# Patient Record
Sex: FEMALE | Race: OTHER | HISPANIC OR LATINO
[De-identification: names, ages, dates, MRNs, and addresses within clinical notes are randomized per-mention and may not be internally consistent; named-entity substitution may affect disease eponyms.]

---

## 2018-04-05 ENCOUNTER — RESULT REVIEW (OUTPATIENT)
Age: 48
End: 2018-04-05

## 2018-10-18 ENCOUNTER — APPOINTMENT (OUTPATIENT)
Dept: ORTHOPEDIC SURGERY | Facility: CLINIC | Age: 48
End: 2018-10-18
Payer: COMMERCIAL

## 2018-10-18 VITALS — HEART RATE: 78 BPM | HEIGHT: 62 IN | DIASTOLIC BLOOD PRESSURE: 70 MMHG | SYSTOLIC BLOOD PRESSURE: 131 MMHG

## 2018-10-18 VITALS — HEIGHT: 62 IN | BODY MASS INDEX: 27.6 KG/M2 | WEIGHT: 150 LBS

## 2018-10-18 DIAGNOSIS — M70.41 PREPATELLAR BURSITIS, RIGHT KNEE: ICD-10-CM

## 2018-10-18 DIAGNOSIS — Z60.2 PROBLEMS RELATED TO LIVING ALONE: ICD-10-CM

## 2018-10-18 DIAGNOSIS — Z82.62 FAMILY HISTORY OF OSTEOPOROSIS: ICD-10-CM

## 2018-10-18 DIAGNOSIS — Z78.9 OTHER SPECIFIED HEALTH STATUS: ICD-10-CM

## 2018-10-18 DIAGNOSIS — Z82.61 FAMILY HISTORY OF ARTHRITIS: ICD-10-CM

## 2018-10-18 PROCEDURE — 73562 X-RAY EXAM OF KNEE 3: CPT | Mod: RT

## 2018-10-18 PROCEDURE — 99204 OFFICE O/P NEW MOD 45 MIN: CPT

## 2018-10-18 PROCEDURE — 72170 X-RAY EXAM OF PELVIS: CPT

## 2018-10-18 SDOH — SOCIAL STABILITY - SOCIAL INSECURITY: PROBLEMS RELATED TO LIVING ALONE: Z60.2

## 2018-11-02 PROBLEM — Z78.9 CONSUMES ALCOHOL OCCASIONALLY: Status: ACTIVE | Noted: 2018-10-18

## 2018-11-02 PROBLEM — Z82.61 FAMILY HISTORY OF ARTHRITIS: Status: ACTIVE | Noted: 2018-10-18

## 2018-11-02 PROBLEM — Z82.62 FAMILY HISTORY OF OSTEOPOROSIS: Status: ACTIVE | Noted: 2018-10-18

## 2018-11-02 PROBLEM — Z78.9 EXERCISES RARELY: Status: ACTIVE | Noted: 2018-10-18

## 2018-11-02 PROBLEM — Z78.9 DOES NOT USE ILLICIT DRUGS: Status: ACTIVE | Noted: 2018-10-18

## 2018-11-02 PROBLEM — Z78.9 CURRENT NON-SMOKER: Status: ACTIVE | Noted: 2018-10-18

## 2019-10-02 PROBLEM — Z60.2 PERSON LIVING ALONE: Status: ACTIVE | Noted: 2018-10-18

## 2020-02-23 ENCOUNTER — EMERGENCY (EMERGENCY)
Facility: HOSPITAL | Age: 50
LOS: 1 days | Discharge: HOME | End: 2020-02-23
Attending: EMERGENCY MEDICINE | Admitting: EMERGENCY MEDICINE
Payer: COMMERCIAL

## 2020-02-23 VITALS
HEART RATE: 68 BPM | OXYGEN SATURATION: 100 % | RESPIRATION RATE: 16 BRPM | SYSTOLIC BLOOD PRESSURE: 156 MMHG | TEMPERATURE: 98 F | DIASTOLIC BLOOD PRESSURE: 86 MMHG

## 2020-02-23 VITALS
SYSTOLIC BLOOD PRESSURE: 145 MMHG | DIASTOLIC BLOOD PRESSURE: 96 MMHG | HEART RATE: 69 BPM | OXYGEN SATURATION: 98 % | TEMPERATURE: 98 F | RESPIRATION RATE: 20 BRPM

## 2020-02-23 PROCEDURE — 99282 EMERGENCY DEPT VISIT SF MDM: CPT

## 2020-02-23 RX ORDER — IBUPROFEN 200 MG
400 TABLET ORAL ONCE
Refills: 0 | Status: COMPLETED | OUTPATIENT
Start: 2020-02-23 | End: 2020-02-23

## 2020-02-23 RX ORDER — LIDOCAINE 4 G/100G
1 CREAM TOPICAL ONCE
Refills: 0 | Status: COMPLETED | OUTPATIENT
Start: 2020-02-23 | End: 2020-02-23

## 2020-02-23 RX ORDER — ACETAMINOPHEN 500 MG
975 TABLET ORAL ONCE
Refills: 0 | Status: COMPLETED | OUTPATIENT
Start: 2020-02-23 | End: 2020-02-23

## 2020-02-23 RX ADMIN — Medication 400 MILLIGRAM(S): at 04:10

## 2020-02-23 RX ADMIN — Medication 400 MILLIGRAM(S): at 03:35

## 2020-02-23 RX ADMIN — Medication 975 MILLIGRAM(S): at 04:10

## 2020-02-23 RX ADMIN — Medication 975 MILLIGRAM(S): at 03:35

## 2020-02-23 RX ADMIN — LIDOCAINE 1 PATCH: 4 CREAM TOPICAL at 03:35

## 2020-02-23 NOTE — ED PROVIDER NOTE - ATTENDING CONTRIBUTION TO CARE
50F hx sharon now p/w sharp R ant/lat pain x3d after she lifted heavy suitcase w/ no other trauma, falls or rashes. Pain worse with torso movement & palpation. Denies fevers or chills. Reports N and diarrhea. Denies cough, fever or sob.    Gen:  Well appearning in NAD  Head:  NC/AT  Resp: No distress   chesty: +reproduction of sx w/palpation of R ant/lat ribcage  Ext: no deformities  Skin: warm and dry as visualized    MSK related pain after lifting.  No e/o zoster or fx.  meds, reassessment, likely dc.

## 2020-02-23 NOTE — ED PROVIDER NOTE - NSFOLLOWUPINSTRUCTIONS_ED_ALL_ED_FT
-- Please use 650mg Tylenol (also called acetaminophen) every 4 hours & 600mg Motrin (also called Advil or ibuprofen) every 6 hours as needed for pain/discomfort/swelling. You can get these without a prescription. Don't use more than 3000mg of Tylenol in any 24-hour period. Make sure your other prescription/over-the-counter medications don't contain any Tylenol so you don't take too much. If you have any stomach discomfort while taking Motrin, you can use TUMS or Pepcid or Zantac (these can also be bought without a prescription). -- Please use 650mg Tylenol (also called acetaminophen) every 4 hours & 600mg Motrin (also called Advil or ibuprofen) every 6 hours as needed for pain/discomfort/swelling. You can get these without a prescription. Don't use more than 3000mg of Tylenol in any 24-hour period. Make sure your other prescription/over-the-counter medications don't contain any Tylenol so you don't take too much. If you have any stomach discomfort while taking Motrin, you can use TUMS or Pepcid or Zantac (these can also be bought without a prescription).    You were seen in the ED for "rib pain"  The following labs/imaging were obtained: see attached (if applicable)  Take Tylenol and Motrin as mentioned above.   Return to the ED if you develop fever, notice a rash, or develop worsening or new concerning symptoms.  Follow up with your primary care in 2-3 days.  Discussed with pt results of work up, strict return precautions, and need for follow up.  Pt expressed understanding and agrees with plan.

## 2020-02-23 NOTE — ED PROVIDER NOTE - CLINICAL SUMMARY MEDICAL DECISION MAKING FREE TEXT BOX
51 yo F with hx of cholecystectomy presents with "right lower rib" pain for 3 days after lifting heavy suitcase with no trauma, falls or rashes. Vitals WNL. marked tenderness along the 11th right anterior rib with no rash. No RUQ ttp. Likely MSK vs less likely shingles without a rash. Will provide analgesia and re-radha

## 2020-02-23 NOTE — ED ADULT NURSE NOTE - CHIEF COMPLAINT QUOTE
"I have pain to the right ribs radiating towards the back since Thursday. the pain is always there and doesn't go away, it hurts when I press down on it." pt denies injury or trauma. no medical hx. no daily meds

## 2020-02-23 NOTE — ED ADULT TRIAGE NOTE - CHIEF COMPLAINT QUOTE
"I have pain to the right ribs radiating towards the back since thursday. it hurts when I press on it. the pain is always there and doesn't go away, it hurts when I press down on it." pt denies injury or trauma. no medical hx. no daily meds "I have pain to the right ribs radiating towards the back since thursday. the pain is always there and doesn't go away, it hurts when I press down on it." pt denies injury or trauma. no medical hx. no daily meds "I have pain to the right ribs radiating towards the back since Thursday. the pain is always there and doesn't go away, it hurts when I press down on it." pt denies injury or trauma. no medical hx. no daily meds

## 2020-02-23 NOTE — ED PROVIDER NOTE - PATIENT PORTAL LINK FT
You can access the FollowMyHealth Patient Portal offered by [facility] by registering at the following website: [email or web address]. By joining [facility]’s FollowMyHealth portal, you will also be able to view your health information using other applications (apps) compatible with our system.

## 2020-02-23 NOTE — ED ADULT NURSE NOTE - OBJECTIVE STATEMENT
Patient received to room 9, A&Ox4, ambulatory, c/o R sided rib pain since Thursday. Pt. states she was traveling recently and believes she may have injured herself carrying a suitcase. States pain is constant. No trauma/swelling noted to area. Denies CP/SOB/dizziness, no fevers/chills. MD at bedside. Medicated as per orders. VS as noted. Awaiting further orders, will continue to monitor.

## 2020-02-23 NOTE — ED PROVIDER NOTE - PHYSICAL EXAMINATION
Gen: AAOx3, non-toxic  Head: NCAT  HEENT: EOMI, oral mucosa moist, normal conjunctiva  Lung: CTAB, no respiratory distress, no wheezes/rhonchi/rales B/L, speaking in full sentences  CV: RRR, no murmurs, rubs or gallops  Abd:  No RUQ TTP, soft, NTND, no guarding, no CVA tenderness  MSK: marked tenderness along the 11th right anterior rib with no rash.   Neuro: No focal sensory or motor deficits, normal CN exam   Skin: Warm, well perfused, no rash  Psych: normal affect.

## 2020-10-15 ENCOUNTER — TRANSCRIPTION ENCOUNTER (OUTPATIENT)
Age: 50
End: 2020-10-15

## 2021-03-26 NOTE — ED PROVIDER NOTE - OBJECTIVE STATEMENT
49 yo F with hx of cholecystectomy presents with "right lower rib" pain for 3 days after lifting heavy suitcase with no trauma, falls or rashes. Reports noticing pain on her right anterior lower ribs described as a sharp and burning pain worse with movement, or palpation. Denies fevers or chills. Reports N and diarrhea. Denies cough, fever or sob. No Residual Tumor Seen Histology Text: There were no malignant cells seen in the sections examined.

## 2021-04-29 ENCOUNTER — RESULT REVIEW (OUTPATIENT)
Age: 51
End: 2021-04-29

## 2021-05-20 ENCOUNTER — RESULT REVIEW (OUTPATIENT)
Age: 51
End: 2021-05-20

## 2021-06-07 ENCOUNTER — APPOINTMENT (OUTPATIENT)
Dept: GYNECOLOGIC ONCOLOGY | Facility: CLINIC | Age: 51
End: 2021-06-07
Payer: COMMERCIAL

## 2021-06-07 ENCOUNTER — RESULT REVIEW (OUTPATIENT)
Age: 51
End: 2021-06-07

## 2021-06-07 VITALS
BODY MASS INDEX: 27.97 KG/M2 | SYSTOLIC BLOOD PRESSURE: 141 MMHG | WEIGHT: 152 LBS | HEART RATE: 80 BPM | DIASTOLIC BLOOD PRESSURE: 94 MMHG | HEIGHT: 62 IN

## 2021-06-07 DIAGNOSIS — Z80.3 FAMILY HISTORY OF MALIGNANT NEOPLASM OF BREAST: ICD-10-CM

## 2021-06-07 DIAGNOSIS — Z80.8 FAMILY HISTORY OF MALIGNANT NEOPLASM OF OTHER ORGANS OR SYSTEMS: ICD-10-CM

## 2021-06-07 PROCEDURE — 99244 OFF/OP CNSLTJ NEW/EST MOD 40: CPT

## 2021-06-07 RX ORDER — ASCORBIC ACID 500 MG
TABLET ORAL
Refills: 0 | Status: ACTIVE | COMMUNITY

## 2021-06-07 RX ORDER — PNV NO.95/FERROUS FUM/FOLIC AC 28MG-0.8MG
TABLET ORAL
Refills: 0 | Status: ACTIVE | COMMUNITY

## 2021-06-07 RX ORDER — DICLOFENAC SODIUM 75 MG/1
75 TABLET, DELAYED RELEASE ORAL
Qty: 1 | Refills: 1 | Status: DISCONTINUED | COMMUNITY
Start: 2018-10-18 | End: 2021-06-07

## 2021-06-08 ENCOUNTER — OUTPATIENT (OUTPATIENT)
Dept: OUTPATIENT SERVICES | Facility: HOSPITAL | Age: 51
LOS: 1 days | End: 2021-06-08
Payer: COMMERCIAL

## 2021-06-08 ENCOUNTER — APPOINTMENT (OUTPATIENT)
Dept: CT IMAGING | Facility: IMAGING CENTER | Age: 51
End: 2021-06-08
Payer: COMMERCIAL

## 2021-06-08 DIAGNOSIS — C55 MALIGNANT NEOPLASM OF UTERUS, PART UNSPECIFIED: ICD-10-CM

## 2021-06-08 DIAGNOSIS — Z00.8 ENCOUNTER FOR OTHER GENERAL EXAMINATION: ICD-10-CM

## 2021-06-08 PROCEDURE — 74177 CT ABD & PELVIS W/CONTRAST: CPT

## 2021-06-08 PROCEDURE — 74177 CT ABD & PELVIS W/CONTRAST: CPT | Mod: 26

## 2021-06-08 PROCEDURE — 82565 ASSAY OF CREATININE: CPT

## 2021-06-17 ENCOUNTER — NON-APPOINTMENT (OUTPATIENT)
Age: 51
End: 2021-06-17

## 2021-06-21 ENCOUNTER — TRANSCRIPTION ENCOUNTER (OUTPATIENT)
Age: 51
End: 2021-06-21

## 2021-06-21 ENCOUNTER — OUTPATIENT (OUTPATIENT)
Dept: OUTPATIENT SERVICES | Facility: HOSPITAL | Age: 51
LOS: 1 days | End: 2021-06-21

## 2021-06-21 VITALS
HEART RATE: 65 BPM | WEIGHT: 156.09 LBS | HEIGHT: 60 IN | RESPIRATION RATE: 16 BRPM | DIASTOLIC BLOOD PRESSURE: 74 MMHG | OXYGEN SATURATION: 98 % | SYSTOLIC BLOOD PRESSURE: 126 MMHG | TEMPERATURE: 98 F

## 2021-06-21 DIAGNOSIS — C55 MALIGNANT NEOPLASM OF UTERUS, PART UNSPECIFIED: ICD-10-CM

## 2021-06-21 DIAGNOSIS — Z90.49 ACQUIRED ABSENCE OF OTHER SPECIFIED PARTS OF DIGESTIVE TRACT: Chronic | ICD-10-CM

## 2021-06-21 LAB
ALBUMIN SERPL ELPH-MCNC: 4.8 G/DL — SIGNIFICANT CHANGE UP (ref 3.3–5)
ALP SERPL-CCNC: 82 U/L — SIGNIFICANT CHANGE UP (ref 40–120)
ALT FLD-CCNC: 13 U/L — SIGNIFICANT CHANGE UP (ref 4–33)
ANION GAP SERPL CALC-SCNC: 13 MMOL/L — SIGNIFICANT CHANGE UP (ref 7–14)
AST SERPL-CCNC: 18 U/L — SIGNIFICANT CHANGE UP (ref 4–32)
BILIRUB SERPL-MCNC: 0.3 MG/DL — SIGNIFICANT CHANGE UP (ref 0.2–1.2)
BLD GP AB SCN SERPL QL: NEGATIVE — SIGNIFICANT CHANGE UP
BUN SERPL-MCNC: 17 MG/DL — SIGNIFICANT CHANGE UP (ref 7–23)
CALCIUM SERPL-MCNC: 9.9 MG/DL — SIGNIFICANT CHANGE UP (ref 8.4–10.5)
CHLORIDE SERPL-SCNC: 103 MMOL/L — SIGNIFICANT CHANGE UP (ref 98–107)
CO2 SERPL-SCNC: 24 MMOL/L — SIGNIFICANT CHANGE UP (ref 22–31)
CREAT SERPL-MCNC: 0.78 MG/DL — SIGNIFICANT CHANGE UP (ref 0.5–1.3)
GLUCOSE SERPL-MCNC: 91 MG/DL — SIGNIFICANT CHANGE UP (ref 70–99)
HCG SERPL-ACNC: <5 MIU/ML — SIGNIFICANT CHANGE UP
HCT VFR BLD CALC: 42.2 % — SIGNIFICANT CHANGE UP (ref 34.5–45)
HGB BLD-MCNC: 13.6 G/DL — SIGNIFICANT CHANGE UP (ref 11.5–15.5)
MCHC RBC-ENTMCNC: 31.1 PG — SIGNIFICANT CHANGE UP (ref 27–34)
MCHC RBC-ENTMCNC: 32.2 GM/DL — SIGNIFICANT CHANGE UP (ref 32–36)
MCV RBC AUTO: 96.6 FL — SIGNIFICANT CHANGE UP (ref 80–100)
NRBC # BLD: 0 /100 WBCS — SIGNIFICANT CHANGE UP
NRBC # FLD: 0 K/UL — SIGNIFICANT CHANGE UP
PLATELET # BLD AUTO: 336 K/UL — SIGNIFICANT CHANGE UP (ref 150–400)
POTASSIUM SERPL-MCNC: 3.8 MMOL/L — SIGNIFICANT CHANGE UP (ref 3.5–5.3)
POTASSIUM SERPL-SCNC: 3.8 MMOL/L — SIGNIFICANT CHANGE UP (ref 3.5–5.3)
PROT SERPL-MCNC: 7.9 G/DL — SIGNIFICANT CHANGE UP (ref 6–8.3)
RBC # BLD: 4.37 M/UL — SIGNIFICANT CHANGE UP (ref 3.8–5.2)
RBC # FLD: 11.5 % — SIGNIFICANT CHANGE UP (ref 10.3–14.5)
RH IG SCN BLD-IMP: POSITIVE — SIGNIFICANT CHANGE UP
SODIUM SERPL-SCNC: 140 MMOL/L — SIGNIFICANT CHANGE UP (ref 135–145)
WBC # BLD: 7.52 K/UL — SIGNIFICANT CHANGE UP (ref 3.8–10.5)
WBC # FLD AUTO: 7.52 K/UL — SIGNIFICANT CHANGE UP (ref 3.8–10.5)

## 2021-06-21 NOTE — H&P PST ADULT - NEGATIVE GENERAL GENITOURINARY SYMPTOMS
no hematuria/no renal colic/no flank pain L/no flank pain R/no urine discoloration/no bladder infections/normal urinary frequency

## 2021-06-21 NOTE — H&P PST ADULT - NSANTHOSAYNRD_GEN_A_CORE
No. MERNA screening performed.  STOP BANG Legend: 0-2 = LOW Risk; 3-4 = INTERMEDIATE Risk; 5-8 = HIGH Risk

## 2021-06-21 NOTE — H&P PST ADULT - NSICDXPROBLEM_GEN_ALL_CORE_FT
PROBLEM DIAGNOSES  Problem: Malignant neoplasm of uterus  Assessment and Plan: Patient tentatively scheduled for        PROBLEM DIAGNOSES  Problem: Malignant neoplasm of uterus  Assessment and Plan: Patient tentatively scheduled for robotic assisted total laparoscopic hysterectomy bilateral salpingo oophorectomy , sentinel lymph node mapping and excision on 7/6/21.  Pre-op instructions provided. Pt given verbal and written instructions with teach back on chlorhexidine shampoo and pepcid. Pt verbalized understanding with return demonstration.   Copy of Covid Vaccination card in chart.   Covid testing scheduled prior to surgery as per patient.   Patient scheduled for medical evaluation as per surgeon, Copy requested.   Recent echo results requested

## 2021-06-21 NOTE — H&P PST ADULT - HISTORY OF PRESENT ILLNESS
51 year old female with no PMH  presents to Presurgical testing with diagnosis of malignant neoplasm of uterus scheduled for robotic assisted total laparoscopic hysterectomy bilateral salpingo oophorectomy , sentinel lymph node mapping and excision. Patient with c/o of vaginal spotting few months ago. US revealed endometrial fluid and polyp. D&C done , Biopsy revealed malignancy.

## 2021-06-21 NOTE — H&P PST ADULT - NEGATIVE NEUROLOGICAL SYMPTOMS
no transient paralysis/no weakness/no paresthesias/no generalized seizures/no tremors/no difficulty walking

## 2021-06-21 NOTE — H&P PST ADULT - RS GEN PE MLT RESP DETAILS PC
airway patent/breath sounds equal/good air movement/clear to auscultation bilaterally/no rales/no rhonchi

## 2021-06-21 NOTE — H&P PST ADULT - NSICDXFAMILYHX_GEN_ALL_CORE_FT
FAMILY HISTORY:  Father  Still living? Yes, Estimated age: Age Unknown  FH: skin cancer, Age at diagnosis: Age Unknown

## 2021-07-03 ENCOUNTER — APPOINTMENT (OUTPATIENT)
Dept: DISASTER EMERGENCY | Facility: CLINIC | Age: 51
End: 2021-07-03

## 2021-07-03 DIAGNOSIS — Z01.818 ENCOUNTER FOR OTHER PREPROCEDURAL EXAMINATION: ICD-10-CM

## 2021-07-04 LAB — SARS-COV-2 N GENE NPH QL NAA+PROBE: NOT DETECTED

## 2021-07-05 ENCOUNTER — TRANSCRIPTION ENCOUNTER (OUTPATIENT)
Age: 51
End: 2021-07-05

## 2021-07-06 ENCOUNTER — INPATIENT (INPATIENT)
Facility: HOSPITAL | Age: 51
LOS: 0 days | Discharge: HOME | End: 2021-07-07
Attending: OBSTETRICS & GYNECOLOGY | Admitting: OBSTETRICS & GYNECOLOGY
Payer: COMMERCIAL

## 2021-07-06 ENCOUNTER — RESULT REVIEW (OUTPATIENT)
Age: 51
End: 2021-07-06

## 2021-07-06 ENCOUNTER — APPOINTMENT (OUTPATIENT)
Dept: GYNECOLOGIC ONCOLOGY | Facility: HOSPITAL | Age: 51
End: 2021-07-06

## 2021-07-06 VITALS
HEIGHT: 60 IN | WEIGHT: 156.09 LBS | HEART RATE: 70 BPM | OXYGEN SATURATION: 99 % | TEMPERATURE: 99 F | DIASTOLIC BLOOD PRESSURE: 74 MMHG | RESPIRATION RATE: 15 BRPM | SYSTOLIC BLOOD PRESSURE: 131 MMHG

## 2021-07-06 DIAGNOSIS — Z90.49 ACQUIRED ABSENCE OF OTHER SPECIFIED PARTS OF DIGESTIVE TRACT: Chronic | ICD-10-CM

## 2021-07-06 DIAGNOSIS — C55 MALIGNANT NEOPLASM OF UTERUS, PART UNSPECIFIED: ICD-10-CM

## 2021-07-06 LAB
BASOPHILS # BLD AUTO: 0.03 K/UL — SIGNIFICANT CHANGE UP (ref 0–0.2)
BASOPHILS NFR BLD AUTO: 0.1 % — SIGNIFICANT CHANGE UP (ref 0–2)
EOSINOPHIL # BLD AUTO: 0.01 K/UL — SIGNIFICANT CHANGE UP (ref 0–0.5)
EOSINOPHIL NFR BLD AUTO: 0 % — SIGNIFICANT CHANGE UP (ref 0–6)
GLUCOSE BLDC GLUCOMTR-MCNC: 79 MG/DL — SIGNIFICANT CHANGE UP (ref 70–99)
HCG UR QL: NEGATIVE — SIGNIFICANT CHANGE UP
HCT VFR BLD CALC: 41 % — SIGNIFICANT CHANGE UP (ref 34.5–45)
HGB BLD-MCNC: 12.9 G/DL — SIGNIFICANT CHANGE UP (ref 11.5–15.5)
IANC: 19.37 K/UL — HIGH (ref 1.5–8.5)
IMM GRANULOCYTES NFR BLD AUTO: 0.6 % — SIGNIFICANT CHANGE UP (ref 0–1.5)
LYMPHOCYTES # BLD AUTO: 1.57 K/UL — SIGNIFICANT CHANGE UP (ref 1–3.3)
LYMPHOCYTES # BLD AUTO: 7.3 % — LOW (ref 13–44)
MCHC RBC-ENTMCNC: 31 PG — SIGNIFICANT CHANGE UP (ref 27–34)
MCHC RBC-ENTMCNC: 31.5 GM/DL — LOW (ref 32–36)
MCV RBC AUTO: 98.6 FL — SIGNIFICANT CHANGE UP (ref 80–100)
MONOCYTES # BLD AUTO: 0.32 K/UL — SIGNIFICANT CHANGE UP (ref 0–0.9)
MONOCYTES NFR BLD AUTO: 1.5 % — LOW (ref 2–14)
NEUTROPHILS # BLD AUTO: 19.37 K/UL — HIGH (ref 1.8–7.4)
NEUTROPHILS NFR BLD AUTO: 90.5 % — HIGH (ref 43–77)
NRBC # BLD: 0 /100 WBCS — SIGNIFICANT CHANGE UP
NRBC # FLD: 0 K/UL — SIGNIFICANT CHANGE UP
PLATELET # BLD AUTO: 285 K/UL — SIGNIFICANT CHANGE UP (ref 150–400)
RBC # BLD: 4.16 M/UL — SIGNIFICANT CHANGE UP (ref 3.8–5.2)
RBC # FLD: 11.9 % — SIGNIFICANT CHANGE UP (ref 10.3–14.5)
WBC # BLD: 21.43 K/UL — HIGH (ref 3.8–10.5)
WBC # FLD AUTO: 21.43 K/UL — HIGH (ref 3.8–10.5)

## 2021-07-06 PROCEDURE — 58571 TLH W/T/O 250 G OR LESS: CPT

## 2021-07-06 PROCEDURE — 88112 CYTOPATH CELL ENHANCE TECH: CPT | Mod: 26

## 2021-07-06 PROCEDURE — 88309 TISSUE EXAM BY PATHOLOGIST: CPT | Mod: 26

## 2021-07-06 PROCEDURE — 88342 IMHCHEM/IMCYTCHM 1ST ANTB: CPT | Mod: 26

## 2021-07-06 PROCEDURE — 88307 TISSUE EXAM BY PATHOLOGIST: CPT | Mod: 26

## 2021-07-06 PROCEDURE — 38570 LAPAROSCOPY LYMPH NODE BIOP: CPT

## 2021-07-06 PROCEDURE — S2900 ROBOTIC SURGICAL SYSTEM: CPT | Mod: NC

## 2021-07-06 PROCEDURE — 38900 IO MAP OF SENT LYMPH NODE: CPT | Mod: 50

## 2021-07-06 PROCEDURE — 88341 IMHCHEM/IMCYTCHM EA ADD ANTB: CPT | Mod: 26

## 2021-07-06 RX ORDER — SODIUM CHLORIDE 9 MG/ML
1000 INJECTION, SOLUTION INTRAVENOUS
Refills: 0 | Status: DISCONTINUED | OUTPATIENT
Start: 2021-07-06 | End: 2021-07-07

## 2021-07-06 RX ORDER — ACETAMINOPHEN 500 MG
1000 TABLET ORAL EVERY 6 HOURS
Refills: 0 | Status: DISCONTINUED | OUTPATIENT
Start: 2021-07-06 | End: 2021-07-07

## 2021-07-06 RX ORDER — HYDROMORPHONE HYDROCHLORIDE 2 MG/ML
0.5 INJECTION INTRAMUSCULAR; INTRAVENOUS; SUBCUTANEOUS
Refills: 0 | Status: DISCONTINUED | OUTPATIENT
Start: 2021-07-06 | End: 2021-07-07

## 2021-07-06 RX ORDER — ONDANSETRON 8 MG/1
4 TABLET, FILM COATED ORAL ONCE
Refills: 0 | Status: DISCONTINUED | OUTPATIENT
Start: 2021-07-06 | End: 2021-07-06

## 2021-07-06 RX ORDER — ONDANSETRON 8 MG/1
8 TABLET, FILM COATED ORAL EVERY 8 HOURS
Refills: 0 | Status: DISCONTINUED | OUTPATIENT
Start: 2021-07-06 | End: 2021-07-06

## 2021-07-06 RX ORDER — ONDANSETRON 8 MG/1
4 TABLET, FILM COATED ORAL EVERY 6 HOURS
Refills: 0 | Status: DISCONTINUED | OUTPATIENT
Start: 2021-07-06 | End: 2021-07-07

## 2021-07-06 RX ORDER — SENNA PLUS 8.6 MG/1
1 TABLET ORAL AT BEDTIME
Refills: 0 | Status: DISCONTINUED | OUTPATIENT
Start: 2021-07-06 | End: 2021-07-07

## 2021-07-06 RX ORDER — SIMETHICONE 80 MG/1
80 TABLET, CHEWABLE ORAL EVERY 6 HOURS
Refills: 0 | Status: DISCONTINUED | OUTPATIENT
Start: 2021-07-06 | End: 2021-07-07

## 2021-07-06 RX ORDER — SODIUM CHLORIDE 9 MG/ML
3 INJECTION INTRAMUSCULAR; INTRAVENOUS; SUBCUTANEOUS EVERY 8 HOURS
Refills: 0 | Status: DISCONTINUED | OUTPATIENT
Start: 2021-07-06 | End: 2021-07-07

## 2021-07-06 RX ORDER — HEPARIN SODIUM 5000 [USP'U]/ML
5000 INJECTION INTRAVENOUS; SUBCUTANEOUS EVERY 8 HOURS
Refills: 0 | Status: DISCONTINUED | OUTPATIENT
Start: 2021-07-06 | End: 2021-07-07

## 2021-07-06 RX ORDER — IBUPROFEN 200 MG
600 TABLET ORAL EVERY 6 HOURS
Refills: 0 | Status: DISCONTINUED | OUTPATIENT
Start: 2021-07-06 | End: 2021-07-07

## 2021-07-06 RX ORDER — OXYCODONE HYDROCHLORIDE 5 MG/1
5 TABLET ORAL
Refills: 0 | Status: DISCONTINUED | OUTPATIENT
Start: 2021-07-06 | End: 2021-07-07

## 2021-07-06 RX ADMIN — Medication 600 MILLIGRAM(S): at 23:55

## 2021-07-06 RX ADMIN — HYDROMORPHONE HYDROCHLORIDE 0.5 MILLIGRAM(S): 2 INJECTION INTRAMUSCULAR; INTRAVENOUS; SUBCUTANEOUS at 21:00

## 2021-07-06 RX ADMIN — SODIUM CHLORIDE 125 MILLILITER(S): 9 INJECTION, SOLUTION INTRAVENOUS at 20:13

## 2021-07-06 RX ADMIN — HYDROMORPHONE HYDROCHLORIDE 0.5 MILLIGRAM(S): 2 INJECTION INTRAMUSCULAR; INTRAVENOUS; SUBCUTANEOUS at 20:30

## 2021-07-06 RX ADMIN — Medication 1000 MILLIGRAM(S): at 23:54

## 2021-07-06 RX ADMIN — HEPARIN SODIUM 5000 UNIT(S): 5000 INJECTION INTRAVENOUS; SUBCUTANEOUS at 22:59

## 2021-07-06 RX ADMIN — SODIUM CHLORIDE 125 MILLILITER(S): 9 INJECTION, SOLUTION INTRAVENOUS at 21:41

## 2021-07-06 NOTE — ASU DISCHARGE PLAN (ADULT/PEDIATRIC) - PROVIDER TOKENS
PROVIDER:[TOKEN:[[id number]],SCHEDULEDAPPT:[07/22/2021],SCHEDULEDAPPTTIME:[11:30 AM],ESTABLISHEDPATIENT:[T]]

## 2021-07-06 NOTE — ASU DISCHARGE PLAN (ADULT/PEDIATRIC) - CARE PROVIDER_API CALL
Abena Wells)  Gynecologic Oncology; Obstetrics and Gynecology  [address] [place] [address]  Phone: [phone]  Fax: [phone]  Established Patient  Scheduled Appointment: 07/22/2021 11:30 AM

## 2021-07-06 NOTE — BRIEF OPERATIVE NOTE - NSICDXBRIEFPROCEDURE_GEN_ALL_CORE_FT
PROCEDURES:  Hysterectomy, total, with BSO, and bilateral pelvic lymph node dissection, robot assisted 06-Jul-2021 19:40:59  Tosin Curtis

## 2021-07-06 NOTE — BRIEF OPERATIVE NOTE - OPERATION/FINDINGS
-EUA: Approximately 6cm mobile uterus, smooth RV septum. Grossly normal external genitalia.   -LSC: Hemostatic entry site, grossly normal upper abdominal survey; pelvic survey with normal appearing uterus, bilateral fallopian tubes and ovaries. No gross pelvic or abdominal disease. Omentum, bowel and pelvic lymph nodes grossly normal.   -Frozen: Endometrial carcinoma, <20% invasion -EUA: Approximately 6cm mobile uterus, smooth RV septum. Grossly normal external genitalia.   -LSC: Hemostatic entry site, grossly normal upper abdominal survey; pelvic survey with normal appearing uterus, bilateral fallopian tubes and ovaries. No gross pelvic or abdominal disease. Omentum, bowel and pelvic lymph nodes grossly normal.   -Frozen: Endometrial carcinoma, <50% invasion  Bilateral sentinel lymph node mapping to external iliac LNs

## 2021-07-06 NOTE — ASU DISCHARGE PLAN (ADULT/PEDIATRIC) - ASU DC SPECIAL INSTRUCTIONSFT
Return to your regular way of eating.  Resume normal activity as tolerated, but no heavy lifting or strenuous activity for 6 weeks.  No driving for next 2 weeks and/or while on narcotic pain medication.  Complete vaginal rest, no tampons, no douching, no tub bathing, no sexual activities for 6 weeks unless otherwise instructed by your doctor.  Call your doctor with any signs and symptoms of infection such as fever, chills, nausea or vomiting.  Call your doctor with redness or swelling at the incision site, fluid leakage or wound separation.  Call your doctor if you're unable to tolerate food or have difficulty urinating.  Call your doctor if you have pain that is not relieved by your prescribed medications.  Notify your doctor with any other concerns.  Follow up with Dr. Wells on July 22 at 11:30am.

## 2021-07-06 NOTE — ASU PREOP CHECKLIST - SELECT TESTS ORDERED
79/BMP/CMP/Type and Cross/Type and Screen/POCT Blood Glucose/COVID-19 79/BMP/CMP/Type and Cross/Type and Screen/UCG/POCT Blood Glucose/COVID-19

## 2021-07-06 NOTE — ASU PREOP CHECKLIST - NS PREOP CHK MONITOR ANESTHESIA CONSENT
Called patient in regards to recent MRI.     IMPRESSION:   1. Acute/subacute superior endplate fractures at L3 and L5 with approximately 20% loss of vertebral body height at both levels.  2. Old L1, L2, and L4 fractures with prior vertebroplasty at all 3 levels.  3. Multilevel degenerative disease, described in detail above.    Discussed and reviewed with Dr Cruz. Patient went to ER yesterday for pain - was given pain meds.     --Orders placed for L3 and L5 kyphoplasty with anticipation to undergo procedure 3/20 pending anesthesia.   --Message sent to anesthesia to update    Annamarie Echeverria PA-C  [phone]  Interventional Radiology   Staffed case with Dr Cruz   pending

## 2021-07-06 NOTE — ASU DISCHARGE PLAN (ADULT/PEDIATRIC) - PROCEDURE
Robotic assisted total laparoscopic hysterectomy, bilateral salpingo-oophorectomy, and sentinel lymph node dissection

## 2021-07-07 ENCOUNTER — TRANSCRIPTION ENCOUNTER (OUTPATIENT)
Age: 51
End: 2021-07-07

## 2021-07-07 VITALS
TEMPERATURE: 98 F | DIASTOLIC BLOOD PRESSURE: 74 MMHG | OXYGEN SATURATION: 99 % | RESPIRATION RATE: 17 BRPM | SYSTOLIC BLOOD PRESSURE: 128 MMHG | HEART RATE: 87 BPM

## 2021-07-07 LAB
COVID-19 SPIKE DOMAIN AB INTERP: POSITIVE
COVID-19 SPIKE DOMAIN ANTIBODY RESULT: >250 U/ML — HIGH
NON-GYNECOLOGICAL CYTOLOGY STUDY: SIGNIFICANT CHANGE UP
SARS-COV-2 IGG+IGM SERPL QL IA: >250 U/ML — HIGH
SARS-COV-2 IGG+IGM SERPL QL IA: POSITIVE

## 2021-07-07 RX ORDER — OXYCODONE AND ACETAMINOPHEN 5; 325 MG/1; MG/1
1 TABLET ORAL
Qty: 4 | Refills: 0
Start: 2021-07-07

## 2021-07-07 RX ADMIN — OXYCODONE HYDROCHLORIDE 5 MILLIGRAM(S): 5 TABLET ORAL at 02:49

## 2021-07-07 RX ADMIN — Medication 1000 MILLIGRAM(S): at 06:21

## 2021-07-07 RX ADMIN — OXYCODONE HYDROCHLORIDE 5 MILLIGRAM(S): 5 TABLET ORAL at 03:50

## 2021-07-07 RX ADMIN — Medication 600 MILLIGRAM(S): at 06:20

## 2021-07-07 RX ADMIN — SODIUM CHLORIDE 3 MILLILITER(S): 9 INJECTION INTRAMUSCULAR; INTRAVENOUS; SUBCUTANEOUS at 06:17

## 2021-07-07 RX ADMIN — HEPARIN SODIUM 5000 UNIT(S): 5000 INJECTION INTRAVENOUS; SUBCUTANEOUS at 06:21

## 2021-07-07 NOTE — PROGRESS NOTE ADULT - ASSESSMENT
ASSESSMENT:  51y female now POD# 1 s/p RA TLH, BSO, and SLND    Neuro: PCA for pain control. // Continue oral meds for pain control.  CV: Hemodynamically stable.   Pulm: Saturating well on RA. Increase incentive spirometry.  GI: Advance diet as tolerated  : Combs in place. Adequate UOP  FEN: LR@125  ID: Afebrile  Heme: Continue HSQ/Lovenox/Venodynes for DVT ppx. Increase ambulation.     Dispo: Home     Sylvia Mchugh MD, PGY-1 51y female now POD# 1 s/p RA TLH, BSO, and SLND for FIGO grade 1 endometrial carcinoma. Patient has remained stable postoperatively with reassuring physical exam, meeting all appropriate postoperative milestones.     Neuro: Continue oral ibuprofen, acetaminophen with Oxycodone PRN for pain.   CV: Hemodynamically stable overnight.   Pulm: Saturating well on RA. Increase incentive spirometry.  GI: Advance diet as tolerated, encourage PO intake.   : Voiding spontaneously.   FEN: Will SLIV.   ID: Afebrile  Heme: Continue HSQ/Venodynes for DVT ppx. Increase ambulation.     Dispo: Continue routine postoperative management - encourage PO intake and breakfast this morning, anticipate discharge home later today.     Sylvia Mchugh MD, PGY-1  Yady, PGY-3  A/P: 51y female now POD# 1 s/p RA TLH, BSO, and SLND. Hemodynamically stable and doing well.    Neuro: Continue oral meds for pain control.  CV: Hemodynamically stable.   Pulm: Saturating well on RA. Increase incentive spirometry.  GI: Reg diet  : voiding spontaneously per patient, although not documented  FEN: LR@125  ID: Afebrile  Heme: Continue HSQ. Venodynes for DVT ppx. Increase ambulation.     Dispo: Home     Sylvia Mchugh MD, PGY-1

## 2021-07-07 NOTE — DISCHARGE NOTE PROVIDER - CARE PROVIDER_API CALL
Abena Wells)  Gynecologic Oncology; Obstetrics and Gynecology  [address] [place] [address]  Phone: [phone]  Fax: [phone]  Established Patient  Scheduled Appointment: 07/22/2021 11:30 AM

## 2021-07-07 NOTE — DISCHARGE NOTE PROVIDER - PROVIDER RX CONTACT NUMBER
----- Message from Lucian Marshall sent at 4/3/2019 12:48 PM CDT -----  Contact: Rozina Levine phone call:    Caller: Rozina King [facility]  Primary cardiologist: Dr. Choe  Detailed reason for call: Homecare needs clarification on patients medication ASAP, ad she is in the home with the patient right now  New or active symptoms? Active  Best phone number: [phone]  Best time to contact: anytime  Ok to leave a detailed message? yes  Device? no    Additional Info:      [phone]

## 2021-07-07 NOTE — DISCHARGE NOTE PROVIDER - NSDCFUADDINST_GEN_ALL_CORE_FT
Return to your regular way of eating.  Resume normal activity as tolerated, but no heavy lifting or strenuous activity for 6 weeks.  No driving for next 2 weeks and/or while on narcotic pain medication.  Complete vaginal rest, no tampons, no douching, no tub bathing, no sexual activities for 6 weeks unless otherwise instructed by your doctor.  Call your doctor with any signs and symptoms of infection such as fever, chills, nausea or vomiting.  Call your doctor with redness or swelling at the incision site, fluid leakage or wound separation.  Call your doctor if you're unable to tolerate food or have difficulty urinating.  Call your doctor if you have pain that is not relieved by your prescribed medications.  Notify your doctor with any other concerns.  Follow up with Dr. Wells on Jul 22 at 11:30am.   Return to your regular way of eating.  Resume normal activity as tolerated, but no heavy lifting or strenuous activity for 6 weeks.  No driving for next 2 weeks and/or while on narcotic pain medication.  Complete vaginal rest, no tampons, no douching, no tub bathing, no sexual activities for 6 weeks unless otherwise instructed by your doctor.  Call your doctor with any signs and symptoms of infection such as fever, chills, nausea or vomiting.  Call your doctor with redness or swelling at the incision site, fluid leakage or wound separation.  Call your doctor if you're unable to tolerate food or have difficulty urinating.  Call your doctor if you have pain that is not relieved by your prescribed medications.  Notify your doctor with any other concerns.  Follow up with Dr. Wells on July 22 at 11:30am.

## 2021-07-07 NOTE — DISCHARGE NOTE PROVIDER - HOSPITAL COURSE
Patient was admitted to hospital for surgical treatment of FIGO Grade 1 Endometrial Cancer. Surgery was uncomplicated. Patient was transferred from PACU to floor in stable condition. Patient remained hemodynamically stable overnight with no acute events. Pain was well-controlled on IV then PO pain medication. Tolerated regular diet with no GI distress. Out of bed and ambulating successfully. Received SQH 5000mg TID in hospital for anticoagulation. Post-op labs were WNL. Passed trial of void after removal of Combs catheter; appropriate urine production.     PE was unremarkable with abdomen appropriately tender and incision sites CDI. Patient was admitted to hospital for surgical treatment of FIGO Grade 1 Endometrial Cancer. RA TLH, BSO, SLND was uncomplicated. Patient was transferred from PACU to floor in stable condition. Patient remained hemodynamically stable overnight with no acute events. Pain was well-controlled on IV then PO pain medication. Tolerated regular diet with no GI distress. Out of bed and ambulating successfully. Received SQH 5000mg TID in hospital for anticoagulation. Post-op labs were WNL.   Patient was discharged in stable condition, ambulating, tolerating po and voiding spontaneously. Pt to have close follow-up w/ Dr. Wells in office       Patient was admitted to hospital for surgical treatment of FIGO Grade 1 Endometrial Cancer. RA TLH, BSO, SLND was uncomplicated. Patient was transferred from PACU to floor in stable condition. Patient remained hemodynamically stable overnight with no acute events. Pain was well-controlled on PO pain medication. Tolerated regular diet with no GI distress. Out of bed and ambulating successfully. Received SQH 5000mg TID in hospital for anticoagulation. Post-op labs were WNL.   Patient was discharged in stable condition, ambulating, tolerating po and voiding spontaneously. Pt to have close follow-up w/ Dr. Wells in office

## 2021-07-07 NOTE — PROGRESS NOTE ADULT - SUBJECTIVE AND OBJECTIVE BOX
Gyn ONC Progress Note HD#1 POD# 1    Patient seen and examined at bedside. No events overnight. Tolerating regular diet. TOV due at 6:30am.     Objective:  T(F): 98.4 (07-07-21 @ 06:18), Max: 98.8 (07-06-21 @ 13:59)  HR: 73 (07-07-21 @ 06:18) (70 - 87)  BP: 117/73 (07-07-21 @ 06:18) (113/70 - 140/79)  RR: 18 (07-07-21 @ 06:18) (14 - 21)  SpO2: 97% (07-07-21 @ 06:18) (93% - 100%)  Wt(kg): --  I&O's Summary    06 Jul 2021 07:01  -  07 Jul 2021 06:45  --------------------------------------------------------  IN: 675 mL / OUT: 830 mL / NET: -155 mL      CAPILLARY BLOOD GLUCOSE      POCT Blood Glucose.: 79 mg/dL (06 Jul 2021 14:04)      MEDICATIONS  (STANDING):  acetaminophen   Tablet .. 1000 milliGRAM(s) Oral every 6 hours  heparin   Injectable 5000 Unit(s) SubCutaneous every 8 hours  ibuprofen  Tablet. 600 milliGRAM(s) Oral every 6 hours  lactated ringers. 1000 milliLiter(s) (125 mL/Hr) IV Continuous <Continuous>  lactated ringers. 1000 milliLiter(s) (30 mL/Hr) IV Continuous <Continuous>  sodium chloride 0.9% lock flush 3 milliLiter(s) IV Push every 8 hours    MEDICATIONS  (PRN):  acetaminophen   Tablet .. 1000 milliGRAM(s) Oral every 6 hours PRN Mild Pain (1 - 3)  ondansetron Injectable 4 milliGRAM(s) IV Push every 6 hours PRN Nausea and/or Vomiting  oxyCODONE    IR 5 milliGRAM(s) Oral every 3 hours PRN Moderate Pain (4 - 6)  senna 1 Tablet(s) Oral at bedtime PRN Constipation  simethicone 80 milliGRAM(s) Chew every 6 hours PRN Gas      Physical Exam:  Constitutional: NAD, A+O x3  CV: RRR  Lungs: Clear to auscultation bilaterally  Abdomen: Soft, appropriately distended, appropriately tender, no guarding, no rebound, normal bowel sounds  Incision: Clean, dry, intact  Extremities: No lower extremity edema or calf tenderness bilaterally; venodynes in place    LABS:                     Gyn ONC Progress Note HD#2 POD#1    Patient seen and examined at bedside. No acute events overnight. She reports abdominal pain overnight with minimal relief from Tylenol/Ibuprofen. She took Oxycodone only 1x but reported improved relief afterwards. She has not yet passed flatus. She has tolerated liquids but not yet attempted solid foods. She reports voiding in PACU prior to moving to her hospital room, but has not ambulated since her arrival upstairs overnight. Denies lightheadedness, dizziness, shortness of breath. Denies nausea or emesis.     Objective:  T(F): 98.4 (07-07-21 @ 06:18), Max: 98.8 (07-06-21 @ 13:59)  HR: 73 (07-07-21 @ 06:18) (70 - 87)  BP: 117/73 (07-07-21 @ 06:18) (113/70 - 140/79)  RR: 18 (07-07-21 @ 06:18) (14 - 21)  SpO2: 97% (07-07-21 @ 06:18) (93% - 100%)    I&O's Summary  06 Jul 2021 07:01  -  07 Jul 2021 06:45  --------------------------------------------------------  IN: 675 mL / OUT: 830 mL / NET: -155 mL      CAPILLARY BLOOD GLUCOSE  POCT Blood Glucose.: 79 mg/dL (06 Jul 2021 14:04)      MEDICATIONS  (STANDING):  acetaminophen   Tablet .. 1000 milliGRAM(s) Oral every 6 hours  heparin   Injectable 5000 Unit(s) SubCutaneous every 8 hours  ibuprofen  Tablet. 600 milliGRAM(s) Oral every 6 hours  lactated ringers. 1000 milliLiter(s) (125 mL/Hr) IV Continuous <Continuous>  lactated ringers. 1000 milliLiter(s) (30 mL/Hr) IV Continuous <Continuous>  sodium chloride 0.9% lock flush 3 milliLiter(s) IV Push every 8 hours    MEDICATIONS  (PRN):  acetaminophen   Tablet .. 1000 milliGRAM(s) Oral every 6 hours PRN Mild Pain (1 - 3)  ondansetron Injectable 4 milliGRAM(s) IV Push every 6 hours PRN Nausea and/or Vomiting  oxyCODONE    IR 5 milliGRAM(s) Oral every 3 hours PRN Moderate Pain (4 - 6)  senna 1 Tablet(s) Oral at bedtime PRN Constipation  simethicone 80 milliGRAM(s) Chew every 6 hours PRN Gas      Physical Exam:  Constitutional: NAD, A+O x3  CV: RRR  Lungs: Clear to auscultation bilaterally  Abdomen: Soft, appropriately distended, appropriately tender, no guarding, no rebound, normal bowel sounds  Incision: Lsc port site incisions clean, dry, intact  Extremities: No lower extremity edema or calf tenderness bilaterally; venodynes in place             Gyn ONC Progress Note HD#1 POD# 1    Patient seen and examined at bedside. No events overnight. Tolerating regular diet. Patient endorses voiding spontaneously since surgery.    Objective:  T(F): 98.4 (07-07-21 @ 06:18), Max: 98.8 (07-06-21 @ 13:59)  HR: 73 (07-07-21 @ 06:18) (70 - 87)  BP: 117/73 (07-07-21 @ 06:18) (113/70 - 140/79)  RR: 18 (07-07-21 @ 06:18) (14 - 21)  SpO2: 97% (07-07-21 @ 06:18) (93% - 100%)  Wt(kg): --    I&O's Summary    06 Jul 2021 07:01  -  07 Jul 2021 06:45  --------------------------------------------------------  IN: 675 mL / OUT: 830 mL / NET: -155 mL      CAPILLARY BLOOD GLUCOSE  POCT Blood Glucose.: 79 mg/dL (06 Jul 2021 14:04)      MEDICATIONS  (STANDING):  acetaminophen   Tablet .. 1000 milliGRAM(s) Oral every 6 hours  heparin   Injectable 5000 Unit(s) SubCutaneous every 8 hours  ibuprofen  Tablet. 600 milliGRAM(s) Oral every 6 hours  lactated ringers. 1000 milliLiter(s) (125 mL/Hr) IV Continuous <Continuous>  lactated ringers. 1000 milliLiter(s) (30 mL/Hr) IV Continuous <Continuous>  sodium chloride 0.9% lock flush 3 milliLiter(s) IV Push every 8 hours    MEDICATIONS  (PRN):  acetaminophen   Tablet .. 1000 milliGRAM(s) Oral every 6 hours PRN Mild Pain (1 - 3)  ondansetron Injectable 4 milliGRAM(s) IV Push every 6 hours PRN Nausea and/or Vomiting  oxyCODONE    IR 5 milliGRAM(s) Oral every 3 hours PRN Moderate Pain (4 - 6)  senna 1 Tablet(s) Oral at bedtime PRN Constipation  simethicone 80 milliGRAM(s) Chew every 6 hours PRN Gas      Physical Exam:  Constitutional: NAD, A+O x3  CV: RRR  Lungs: Clear to auscultation bilaterally  Abdomen: Soft, appropriately distended, appropriately tender, no guarding, no rebound, normal bowel sounds  Incision: Clean, dry, intact  Extremities: No lower extremity edema or calf tenderness bilaterally; venodynes in place    LABS:

## 2021-07-07 NOTE — DISCHARGE NOTE PROVIDER - NSDCMRMEDTOKEN_GEN_ALL_CORE_FT
4life vitamins: 2 tab(s) orally once a day LD 6/30/2021  ginkgo oral tablet: 2 tab(s) orally once a day LD 6/30/2021  naproxen 500 mg oral tablet: 1 tab(s) orally every 12 hours MDD:2  oxycodone-acetaminophen 5 mg-325 mg oral tablet: 1 tab(s) orally every 6 hours, As Needed MDD:4  Vitamin B-12: 2 tab(s) orally once a day

## 2021-07-07 NOTE — DISCHARGE NOTE NURSING/CASE MANAGEMENT/SOCIAL WORK - PATIENT PORTAL LINK FT
You can access the FollowMyHealth Patient Portal offered by [facility] by registering at the following website: [email or web address]. By joining [email or web address]’s FollowMyHealth portal, you will also be able to view your health information using other applications (apps) compatible with our system.

## 2021-07-07 NOTE — PROGRESS NOTE ADULT - SUBJECTIVE AND OBJECTIVE BOX
PA GynOn Post Op Note    Pt seen and examined at bedside. Pt still in PACU. Pt resting comfortably.  Pt denies fever, chills, chest pain, SOB, nausea, vomiting, lightheadedness, dizziness.  Combs catheter in place.      T(F): 98.2 (07-06-21 @ 22:30), Max: 98.8 (07-06-21 @ 13:59)  HR: 87 (07-06-21 @ 23:00) (70 - 87)  BP: 121/66 (07-06-21 @ 23:00) (113/70 - 140/79)  RR: 18 (07-06-21 @ 23:00) (14 - 21)  SpO2: 95% (07-06-21 @ 23:00) (93% - 100%)  Wt(kg): --  I&O's Detail    06 Jul 2021 07:01  -  07 Jul 2021 00:09  --------------------------------------------------------  IN:    Lactated Ringers: 625 mL    Oral Fluid: 50 mL  Total IN: 675 mL    OUT:    Ureteral Catheter (mL): 830 mL  Total OUT: 830 mL    Total NET: -155 mL          Physical Exam:  Constitutional: WDWN female, NAD AxOx3  Skin: warm and dry, no breakdowns noted  Chest: s1s2+, RRR, clear to auscultation bilaterally, no w/r/r    Abdomen: soft, nondistended, no guarding, no rebound, hypoactive bowel sounds,  Appropriate tenderness noted   Incisional site:  vertical dressing clean, dry, intact.   Extremities: no lower extremity edema or calf tenderness bilaterally; intermittent compression stockings in place     LABS:                        12.9   21.43 )-----------( 285      ( 06 Jul 2021 21:19 )             41.0               a/p: This 51y female, s/p     CV: hemodynamically stable  PUL: adequate on RA  GI: NPO at present, tolerating   : Combs in place, DTV  ID: afebrile, WBC stable, labs pending  DVT prophylaxis: SQ Heparin intraop  Pain Management: controlled  continue IV Fluids  pt's home medications added  d/w gyn onc team    Irma Gill, PAC  #[id number] spectra PA GynOnc Post Op Note    Pt seen and examined at bedside and resting comfortably.  Pt denies fever, chills, chest pain, SOB, nausea, vomiting, lightheadedness, dizziness.  Combs catheter was just removed prior to arriving on post surgical floor    T(F): 98.2 (07-06-21 @ 22:30), Max: 98.8 (07-06-21 @ 13:59)  HR: 87 (07-06-21 @ 23:00) (70 - 87)  BP: 121/66 (07-06-21 @ 23:00) (113/70 - 140/79)  RR: 18 (07-06-21 @ 23:00) (14 - 21)  SpO2: 95% (07-06-21 @ 23:00) (93% - 100%)  Wt(kg): --  I&O's Detail    06 Jul 2021 07:01  -  07 Jul 2021 00:09  --------------------------------------------------------  IN:    Lactated Ringers: 625 mL    Oral Fluid: 50 mL  Total IN: 675 mL    OUT:    Ureteral Catheter (mL): 830 mL  Total OUT: 830 mL    Total NET: -155 mL    Physical Exam:  Constitutional: WDWN female, NAD AxOx3  Skin: warm and dry, no breakdowns noted  Chest: s1s2+, RRR, clear to auscultation bilaterally, no w/r/r    Abdomen: soft, nondistended, no guarding, no rebound, hypoactive bowel sounds,  Appropriate tenderness noted   Incisional site: scope sites all  clean and dry with outer dressings intact.   Extremities: no lower extremity edema or calf tenderness bilaterally; intermittent compression stockings in place     LABS:                        12.9   21.43 )-----------( 285      ( 06 Jul 2021 21:19 )             41.0     a/p: This 51y female, s/p Robotic Assisted TLH, BSO. SLND, Frozen=FIGO grade I endometrial cancer, EBL: 50cc Pt stable    CV: hemodynamically stable  PUL: adequate on RA  GI: tolerating clear fluids  :  am  ID: afebrile, WBC stable, labs as above  DVT prophylaxis: SQ Heparin intraop, Venodynes, early ambulation  Pain Management: controlled  continue IV Fluids  d/w gyn onc team    Irma Gill, PAC  #[id number] spectra

## 2021-07-08 ENCOUNTER — NON-APPOINTMENT (OUTPATIENT)
Age: 51
End: 2021-07-08

## 2021-07-22 ENCOUNTER — APPOINTMENT (OUTPATIENT)
Dept: GYNECOLOGIC ONCOLOGY | Facility: CLINIC | Age: 51
End: 2021-07-22
Payer: COMMERCIAL

## 2021-07-22 VITALS
HEART RATE: 70 BPM | WEIGHT: 157.56 LBS | SYSTOLIC BLOOD PRESSURE: 125 MMHG | TEMPERATURE: 97.8 F | OXYGEN SATURATION: 97 % | BODY MASS INDEX: 29 KG/M2 | DIASTOLIC BLOOD PRESSURE: 82 MMHG | HEIGHT: 62 IN

## 2021-07-22 PROCEDURE — 99024 POSTOP FOLLOW-UP VISIT: CPT

## 2021-07-28 LAB — SURGICAL PATHOLOGY STUDY: SIGNIFICANT CHANGE UP

## 2021-08-10 ENCOUNTER — NON-APPOINTMENT (OUTPATIENT)
Age: 51
End: 2021-08-10

## 2021-08-10 DIAGNOSIS — Z09 ENCOUNTER FOR FOLLOW-UP EXAMINATION AFTER COMPLETED TREATMENT FOR CONDITIONS OTHER THAN MALIGNANT NEOPLASM: ICD-10-CM

## 2021-08-12 ENCOUNTER — NON-APPOINTMENT (OUTPATIENT)
Age: 51
End: 2021-08-12

## 2021-08-19 ENCOUNTER — APPOINTMENT (OUTPATIENT)
Dept: GYNECOLOGIC ONCOLOGY | Facility: CLINIC | Age: 51
End: 2021-08-19
Payer: COMMERCIAL

## 2021-08-19 VITALS
SYSTOLIC BLOOD PRESSURE: 128 MMHG | HEIGHT: 62 IN | DIASTOLIC BLOOD PRESSURE: 84 MMHG | OXYGEN SATURATION: 97 % | HEART RATE: 82 BPM | WEIGHT: 159.25 LBS | BODY MASS INDEX: 29.3 KG/M2

## 2021-08-19 PROCEDURE — 99024 POSTOP FOLLOW-UP VISIT: CPT

## 2021-08-19 NOTE — DISCUSSION/SUMMARY
[FreeTextEntry9] : Healing robotic scars [de-identified] : Cuff intact [FreeTextEntry1] : initiate surveillance\par PMD f/u for back pain

## 2021-08-19 NOTE — ASSESSMENT
[FreeTextEntry1] : Stage IAG1 endometrial adenocarcinoma\par Promoter methylation for MLH-1 is pending

## 2021-08-19 NOTE — REASON FOR VISIT
[de-identified] : 7/6/21 [de-identified] : R-A TLH, BSO, SLND [de-identified] : \par \par PATHOLOGY: Stage IAG1 endometrial adenocarcinoma, DOI 28%, cervix and adnexa negative, no LVSI, LN negative, MLH-1 promoter methylation pending\par \par She feels well and is recovering appropriately. Denies vaginal bleeding, discharge or pelvic pain. Tolerating PO without N/V. Denies fever, chills or calf pain. Bowel and bladder functioning normally. No longer requiring pain medication.  C/o back pain that predated surgery.  Also reports ear infection for which she is being treated by an ENT\par

## 2021-09-01 NOTE — ED ADULT NURSE NOTE - ISOLATION TYPE:
[facility]    Cardiology Discharge Summary- Cards 2         Date of Admission:  8/31/2021  Date of Discharge:  9/1/2021  Discharging Provider: Dr. Hernandez      Discharge Diagnoses   Near-syncopal episodes    Follow-ups Needed After Discharge     Unresulted Labs Ordered in the Past 30 Days of this Admission     No orders found for last 31 day(s).        Discharge Disposition   Discharged to home  Condition at discharge: Stable    Hospital Course    Jose Luis Butts is a 73 year old male with PMH of CAD s/p four-vessel CABG on 4/2017, atrial flutter, CRT-D placement on 9/17, moderate MR, and moderate TR status post TVR, CKD stage III, LV thrombus, anemia, hyperlipidemia, gout, and ICM s/p HM III LVAD placement on 8/15/19 c/b RV failure, who is admitted after 4 episodes of near-syncope.     Per patient and his wife, he was experiencing episodes of sudden lower extremity weakness. These episodes did not seem to be brought on by any preceding event/symptoms/prodromic period and he was always able to slowly lower himself to the ground without hitting anything or losing consciousness. He then is back to baseline after 5 minutes. The patient reports he had no other symptoms and was otherwise feeling at his baseline before, during and after these episodes. His LVAD did not alarm and he did not experience any ICD shocks. His labs were remarkable for elevated BUN, elevated Cr (2.14 highest but this seems to be around the upper limit of his baseline), elevated LDH (285, has been here before in setting of his LVAD), mild anemia (Hgb 12s). He was negative for COVID, EKG did not show evidence of any acute ischemic event and head CT was unremarkable for any acute process.     Discussed the patient with neurology and after their evaluation they thought there was a low likelihood that he was having a seizure, although with recent onset of his dementia they felt a routine EEG was  appropriate which did not show any concern for seizure/epileptic activity according to neuro. Interrogation of his LVAD showed no events except for some low PI events and interrogation of his ICD, VT monitoring zone lowered to 140 BPM from 171 BPM per Dr. Hernandez's request. With his extensive diuretic use we were worried about hypovolemia but orthostatic measurements were within normal limits. Being otherwise symptomatic we were not worried about any sort of heart failure exacerbation. PT/OT were consulted and evaluated the patient and identified any specific needs that would need further PT/OT evaluation and treatment. There was no further testing that was indicated and patient was hemodynamically stable, we deemed it was appropriate to discharge him to home and follow up with his cardiologist as was previously scheduled.     Near-syncope episodes  Sudden loss of LE tone with eye gazing  Acute onset LE weakness and loss of balance with dazed eyes while standing w/o prodrome or post-ictal status. No LOC per pt/wife and jerking movement. No change in med. No LVAD alarm or ICD events. VVS on admission, lytes wnl except Mg 2.8. Not hypoglycemic. No signs of infection. CT head w/o contrast unremarkable. DDx include orthostatic hypotension, vasovagal, low flow status (less likely since no LVAD alarm), cardiac arrhythmia (however no ICD events), atonic seizure (sudden loss of LE tone but usually with LOC). Low suspicion for PE given no tachycardia, CP and on RA or ACS since no CP or EKG change. Mildly hypervolemic but not CHF exacerbation. LDH stable, not suggest thrombosis. Potassium and magnesium unremarkable, orthostatic BP's WNL. PT/OT consulted and had no further evaluations or treatments recommended. Neuro evaluated patient, discussed EEG findings with them and they did not find any concerns for seizure-like activity. ICD checked, VT monitor zone lowered to 140 BPM from 171 BPM per Dr. Hernandez's request. Patient is to  follow up with his cardiologist as was previously scheduled for next week (week of 9/6/21).     Chronic systolic heart failure secondary to ICM (Stage D  NYHA Class III)  CAD s/p four-vessel CABG on 4/2017  CRT-D placement on 9/2017  Moderate MR  Moderate TR s/p TVR  - ACEi/ARB:  Contraindicated due to frequent renal dysfunction. Continue PTA hydralazine 125 mg TID  - BB: not on any considering worsening swelling on multiple attempts/RV failure  - Aldosterone antagonist: Contraindicated d/t renal dysfunction  - SCD prophylaxis: ICD  - Fluid status: Mildly hypervolemic. IV Bumex 6mg TID (on Bumex 6mg TID at home) and PTA Diuril 500mg daily  - Statin: atorvastatin 80mg daily  - PTA KCl 60/60/40meq    S/p LVAD implant as DT due to age  VAD Interrogation on August 25, 2021. Occasional PI events with rare associated speed drops. No power spikes or other findings suspicious of pump malfunction noted. MAP: Goal 65-85. Stable .  - Anticoagulation: On Warfarin. INR goal 2-3. INR 2.11 today  - Antiplatelet: ASA 81 mg daily    Afib/flutter  CHADSVASC score 4. On warfarin with INR goal of 2-3. Intolerant to amiodarone per chart. Off digoxin. Not on rete control.   - Warfarin as above     RV Failure    Severely reduced RV function per recent Echo 6/13/2021. Off digoxin.  - Continue diuresis      H/o LV thrombus    Not seen on most recent TTEs.   - Warfarin as above     Elevated Cr on CKD stage IIIb  Cr 2.14<-1.8 (8/27). Still close to his baseline 1.6-2.1.  - BMP daily    Consultations This Hospital Stay   CARDIAC REHAB IP CONSULT  NUTRITION SERVICES ADULT IP CONSULT  PHYSICAL THERAPY ADULT IP CONSULT  PHARMACY TO DOSE WARFARIN  NEUROLOGY GENERAL ADULT IP CONSULT  VASCULAR ACCESS CARE ADULT IP CONSULT    Code Status   Full Code        Ham Ledesma MD   Internal Medicine PGY-1  P: [phone]  [facility]       INaida MD, have seen and examined this patient. I  have discussed with the team and agree with the findings and discharge plan. I have reviewed the hospital course with the patient and have spent 35 minutes in the process of discharge, including discharge planning with patient education, medication teaching, and the coordination of care to outpatient providers.  It has been a pleasure to participate in the care of your patient - please do not hesitate to contact me with any questions.    Naida Hernandez MD  Section Head - Advanced Heart Failure, Transplantation and Mechanical Circulatory Support  Director - Adult Congenital and Cardiovascular [facility]etics [facility]  Associate Professor of Medicine, [facility]    ______________________________________________________________________    Physical Exam   Vital Signs: Temp: 97.8  F (36.6  C) Temp src: Oral BP: (!) 80/65 Pulse: 66   Resp: 16 SpO2: 98 % O2 Device: None (Room air)    Weight: 181 lbs 0 oz  GENERAL: No acute distress, on RA  HEENT: EOMI. PERRLA  NECK: Supple and without lymphadenopathy. JVP ~7 cm  CV: LVAD hum, otherwise RRR, no extra heart sounds  RESPIRATORY: Normal breath sounds, no wheezes or crackles noted  GI: mildly distended, soft with normoactive bowel sounds present in all quadrants. No tenderness, rebound, guarding.    EXTREMITIES: No peripheral edema. Warm and well-perfused  NEUROLOGIC: Alert and orientated x 3. CN II-XII grossly intact. No focal deficits.   MUSCULOSKELETAL: No joint swelling or tenderness.   SKIN: No jaundice. No acute rashes or lesions         Primary Care Physician   TARUN ZHOU    Discharge Orders       Significant Results and Procedures      CT head W/O 8/31/21  1. No acute intracranial pathology.  2. Chronic right cerebellar encephalomalacia.  3. Moderate cerebral atrophy.    ICD device check 9/1/21  Device: Medtronic Claria Quad CRT  Normal device function  Mode: DDDR  bpm  AP: 7%  : 40%  VSR pace: 56%  BVP: 99%  Intrinsic rhythm: ST @ 95 bpm  Thoracic  Impedance: Below reference line.   Short V-V intervals: 2  Lead Trends Appear Stable: yes  Estimated battery longevity to RRT = 2 years. Battery voltage = 2.95V.   AF [place]: 58%  Anticoagulant: warfarin  Ventricular Arrhythmia: 0  Setting Changes: VT monitor zone lowered to 140 bpm from 171 bpm, per Dr. Hernandez order.  Plan: Continue inpatient evaluation and treatment.    LVAD interrogation 9/1/21  Indication of Interrogation:  Other, patient had spells of weakness and unable to stand, dazed.   Type of VAD:  Heartmate 3  Current Parameters:  Flow= 5.3 lpm, Speed= 5900 rpm, Power= 4.9 ramírez, PI (if applicable)= 2.4  Abnormal Alarm on History:  No, many PI events, few with speed drops   Abnormal Events/Parameters Notes:  Yes, explain: PI Events   PI ranging from 1.9-6.1  Flows ranging from 3.7-5.3  History only back to 0740 am today.  Changes Made during Interrogation:  No      Most Recent 3 CBC's:  Recent Labs   Lab Test 09/01/21 0814 08/31/21 1859 08/25/21  1109   WBC 10.0 10.0 14.1*   HGB 12.0* 12.4* 12.7*   MCV 91 90 90    172 199     Most Recent 3 BMP's:  Recent Labs   Lab Test 09/01/21 0813 08/31/21 1859 08/25/21  1109   * 133 138   POTASSIUM 3.9 3.4 4.0   CHLORIDE 95 97 95   CO2 22 26 32   BUN 64* 68* 49*   CR 1.98* 2.14* 1.78*   ANIONGAP 13 10 11   RIDDHI 8.9 9.0 10.1   * 162* 230*     Most Recent 2 LFT's:  Recent Labs   Lab Test 08/31/21 1859 08/25/21  1109   AST 21 24   ALT 32 34   ALKPHOS 152* 148   BILITOTAL 0.7 0.7     Most Recent 3 INR's:  Recent Labs   Lab Test 09/01/21 0813 08/31/21 1859 08/25/21  1109   INR 1.98* 2.11* 2.22*       Discharge Medications   Discharge Medication List as of 9/1/2021  5:56 PM      CONTINUE these medications which have NOT CHANGED    Details   acetaminophen (TYLENOL) 500 MG tablet Take 500-1,000 mg by mouth every 6 hours as needed for mild pain, Historical      aspirin (ASA) 81 MG chewable tablet Take 1 tablet (81 mg) by mouth daily, Disp-90 tablet,  R-3, E-Prescribe      atorvastatin (LIPITOR) 80 MG tablet Take 1 tablet (80 mg) by mouth every evening, Disp-90 tablet, R-3, E-Prescribe      bumetanide (BUMEX) 1 MG tablet Take 6 tablets (6 mg) by mouth 3 times daily (before meals), Disp-450 tablet, R-11, E-Prescribe      chlorothiazide (DIURIL) 250 MG/5ML suspension Take 10 mLs (500 mg) by mouth daily, Disp-400 mL, R-8, E-Prescribe      donepezil (ARICEPT) 5 MG tablet Take 10 mg by mouth At Bedtime , Historical      ferrous sulfate (QC FERROUS SULFATE) 325 (65 Fe) MG tablet Take 1 tablet (325 mg) by mouth daily (with breakfast), Disp-30 tablet, R-11, E-Prescribe      !! hydrALAZINE (APRESOLINE) 100 MG tablet Take 1 tablet (100 mg) by mouth 3 times daily 100 mg tab + 25 mg tab for a total of 125 mg three times a day, Disp-270 tablet, R-3, E-Naaxjudfr312 mg tab + 25 mg tab for a total of 125 mg three times a day      !! hydrALAZINE (APRESOLINE) 25 MG tablet Take 1 tablet (25 mg) by mouth 3 times daily 100 mg tab + 25 mg tab for a total of 125 mg three times a day, Disp-270 tablet, R-3, E-PrescribeNew dose- 100 mg tab + 25 mg tab for a total of 125 mg three times a day      polyethylene glycol (MIRALAX/GLYCOLAX) packet Take 17 grams by mouth once daily, Disp-30 packet, R-0, Historical      potassium chloride ER (KLOR-CON M) 20 MEQ CR tablet 60mEq in the morning, 60mEq in the afternoon, 40mEq at night, Disp-360 tablet, R-3, HistoricalPt do      pramipexole (MIRAPEX) 0.125 MG tablet Take 0.125 mg by mouth At Bedtime, Historical      senna-docusate (SENOKOT-S/PERICOLACE) 8.6-50 MG tablet Take 1 tablet by mouth 2 times daily as needed for constipation, Disp-60 tablet, R-0, E-Prescribe      tamsulosin (FLOMAX) 0.4 MG capsule Take 1 capsule (0.4 mg) by mouth daily, Disp-30 capsule, R-0, Historical      traZODone (DESYREL) 50 MG tablet Take 2 tablets (100 mg) by mouth At Bedtime, Historical      !! warfarin ANTICOAGULANT (COUMADIN) 5 MG tablet Take 1 tablet (5 mg) by mouth  daily Or as directed by the anticoagulation clinic, Disp-90 tablet, R-3, E-Prescribe      !! warfarin ANTICOAGULANT (COUMADIN) 2 MG tablet Take 5 mg by mouth once on Sundays and 6 mg all other days or as directed by the [facility] Anticoagulation clinic, Historical       !! - Potential duplicate medications found. Please discuss with provider.        Allergies   Allergies   Allergen Reactions     Amiodarone      Multiple side effects, including YEYO, abdominal discomfort     Lisinopril Cough        None

## 2022-01-07 ENCOUNTER — APPOINTMENT (OUTPATIENT)
Dept: GYNECOLOGIC ONCOLOGY | Facility: CLINIC | Age: 52
End: 2022-01-07
Payer: COMMERCIAL

## 2022-01-07 VITALS
HEART RATE: 83 BPM | WEIGHT: 160 LBS | DIASTOLIC BLOOD PRESSURE: 80 MMHG | HEIGHT: 62 IN | SYSTOLIC BLOOD PRESSURE: 115 MMHG | BODY MASS INDEX: 29.44 KG/M2

## 2022-01-07 PROCEDURE — 99214 OFFICE O/P EST MOD 30 MIN: CPT

## 2022-01-07 RX ORDER — ZINC SULFATE 50(220)MG
CAPSULE ORAL
Refills: 0 | Status: DISCONTINUED | COMMUNITY
End: 2022-01-07

## 2022-01-11 ENCOUNTER — RESULT REVIEW (OUTPATIENT)
Age: 52
End: 2022-01-11

## 2022-02-04 ENCOUNTER — OUTPATIENT (OUTPATIENT)
Dept: OUTPATIENT SERVICES | Facility: HOSPITAL | Age: 52
LOS: 1 days | End: 2022-02-04
Payer: COMMERCIAL

## 2022-02-04 ENCOUNTER — APPOINTMENT (OUTPATIENT)
Dept: CT IMAGING | Facility: IMAGING CENTER | Age: 52
End: 2022-02-04
Payer: COMMERCIAL

## 2022-02-04 DIAGNOSIS — C55 MALIGNANT NEOPLASM OF UTERUS, PART UNSPECIFIED: ICD-10-CM

## 2022-02-04 DIAGNOSIS — Z90.49 ACQUIRED ABSENCE OF OTHER SPECIFIED PARTS OF DIGESTIVE TRACT: Chronic | ICD-10-CM

## 2022-02-04 PROCEDURE — 74177 CT ABD & PELVIS W/CONTRAST: CPT

## 2022-02-04 PROCEDURE — 74177 CT ABD & PELVIS W/CONTRAST: CPT | Mod: 26

## 2022-02-08 DIAGNOSIS — K76.9 LIVER DISEASE, UNSPECIFIED: ICD-10-CM

## 2022-02-23 ENCOUNTER — APPOINTMENT (OUTPATIENT)
Dept: MRI IMAGING | Facility: IMAGING CENTER | Age: 52
End: 2022-02-23
Payer: COMMERCIAL

## 2022-02-23 ENCOUNTER — OUTPATIENT (OUTPATIENT)
Dept: OUTPATIENT SERVICES | Facility: HOSPITAL | Age: 52
LOS: 1 days | End: 2022-02-23
Payer: COMMERCIAL

## 2022-02-23 DIAGNOSIS — Z90.49 ACQUIRED ABSENCE OF OTHER SPECIFIED PARTS OF DIGESTIVE TRACT: Chronic | ICD-10-CM

## 2022-02-23 DIAGNOSIS — K76.9 LIVER DISEASE, UNSPECIFIED: ICD-10-CM

## 2022-02-23 PROCEDURE — 74183 MRI ABD W/O CNTR FLWD CNTR: CPT

## 2022-02-23 PROCEDURE — 74183 MRI ABD W/O CNTR FLWD CNTR: CPT | Mod: 26

## 2022-02-23 PROCEDURE — A9585: CPT

## 2022-03-16 ENCOUNTER — RESULT REVIEW (OUTPATIENT)
Age: 52
End: 2022-03-16

## 2022-03-16 ENCOUNTER — APPOINTMENT (OUTPATIENT)
Dept: RHEUMATOLOGY | Facility: CLINIC | Age: 52
End: 2022-03-16
Payer: COMMERCIAL

## 2022-03-16 VITALS
OXYGEN SATURATION: 98 % | DIASTOLIC BLOOD PRESSURE: 83 MMHG | WEIGHT: 154 LBS | HEIGHT: 62 IN | BODY MASS INDEX: 28.34 KG/M2 | SYSTOLIC BLOOD PRESSURE: 131 MMHG | HEART RATE: 75 BPM | TEMPERATURE: 98.1 F

## 2022-03-16 DIAGNOSIS — M25.562 PAIN IN RIGHT KNEE: ICD-10-CM

## 2022-03-16 DIAGNOSIS — M25.561 PAIN IN RIGHT KNEE: ICD-10-CM

## 2022-03-16 DIAGNOSIS — M54.50 LOW BACK PAIN, UNSPECIFIED: ICD-10-CM

## 2022-03-16 DIAGNOSIS — Z85.43 PERSONAL HISTORY OF MALIGNANT NEOPLASM OF OVARY: ICD-10-CM

## 2022-03-16 PROCEDURE — 99204 OFFICE O/P NEW MOD 45 MIN: CPT

## 2022-03-16 NOTE — ASSESSMENT
[FreeTextEntry1] : 53 y/o F w lower back pain, b/l knee pain\par =b/l knee pain and lower back pain\par =worse w activities\par =worse in pm\par =no stiffness or swelling\par =obesity \par \par Pt likely has degenerative issue such as lumbar DDD and OA, given hx and exam. Obesity also places pt at higher risk. Will r/o AI/inflammatory condition. \par \par Plan\par -Labs w serologies, inflammatory markers\par -Xrays lumbar spine, knees \par RTO in 2 weeks to discuss dx and treatment plan

## 2022-03-16 NOTE — PHYSICAL EXAM
[FreeTextEntry1] : slightly obese [Auscultation Breath Sounds / Voice Sounds] : lungs were clear to auscultation bilaterally [Heart Sounds] : normal S1 and S2 [Heart Sounds Gallop] : no gallops [Murmurs] : no murmurs [Abdomen Soft] : soft [Heart Sounds Pericardial Friction Rub] : no pericardial rub [Abdomen Tenderness] : non-tender [Musculoskeletal - Swelling] : no joint swelling seen [] : no rash [Skin Lesions] : no skin lesions [Oriented To Time, Place, And Person] : oriented to person, place, and time

## 2022-03-16 NOTE — HISTORY OF PRESENT ILLNESS
[FreeTextEntry1] : 51 y/o F here for initial visit. \par \par Pt reports lower back pain and b/l knee pain for 2 years. Pt says it was after she helped her uncle move. \par Pain is worse w activities. Pt says pain is less in am. Pt reports mild swelling of knees, but no stiffness. \par \par No fevers, h/a, rashes, hair loss, oral ulcers, epistaxis, sinusitis,  swollen glands, dry mouth, dry eyes, CP, SOB, cough, vision changes, abdominal pain, GERD, n/v/d, blood in stool or urine, focal weakness, sensory loss,  Raynaud's, weight loss.

## 2022-03-24 ENCOUNTER — NON-APPOINTMENT (OUTPATIENT)
Age: 52
End: 2022-03-24

## 2022-03-29 ENCOUNTER — NON-APPOINTMENT (OUTPATIENT)
Age: 52
End: 2022-03-29

## 2022-03-29 LAB
ALBUMIN SERPL ELPH-MCNC: 4.9 G/DL
ALP BLD-CCNC: 80 U/L
ALT SERPL-CCNC: 19 U/L
ANION GAP SERPL CALC-SCNC: 14 MMOL/L
AST SERPL-CCNC: 26 U/L
BASOPHILS # BLD AUTO: 0.04 K/UL
BASOPHILS NFR BLD AUTO: 0.5 %
BILIRUB SERPL-MCNC: 0.4 MG/DL
BUN SERPL-MCNC: 13 MG/DL
CALCIUM SERPL-MCNC: 10.1 MG/DL
CCP AB SER IA-ACNC: <8 UNITS
CHLORIDE SERPL-SCNC: 101 MMOL/L
CO2 SERPL-SCNC: 25 MMOL/L
CREAT SERPL-MCNC: 0.73 MG/DL
CRP SERPL-MCNC: <3 MG/L
DSDNA AB SER-ACNC: <12 IU/ML
EGFR: 98 ML/MIN/1.73M2
EOSINOPHIL # BLD AUTO: 0.18 K/UL
EOSINOPHIL NFR BLD AUTO: 2.1 %
ERYTHROCYTE [SEDIMENTATION RATE] IN BLOOD BY WESTERGREN METHOD: 28 MM/HR
GLUCOSE SERPL-MCNC: 104 MG/DL
HCT VFR BLD CALC: 41.7 %
HGB BLD-MCNC: 13.4 G/DL
IMM GRANULOCYTES NFR BLD AUTO: 0.2 %
LYMPHOCYTES # BLD AUTO: 3.14 K/UL
LYMPHOCYTES NFR BLD AUTO: 35.8 %
MAN DIFF?: NORMAL
MCHC RBC-ENTMCNC: 30.9 PG
MCHC RBC-ENTMCNC: 32.1 GM/DL
MCV RBC AUTO: 96.3 FL
MONOCYTES # BLD AUTO: 0.83 K/UL
MONOCYTES NFR BLD AUTO: 9.5 %
NEUTROPHILS # BLD AUTO: 4.56 K/UL
NEUTROPHILS NFR BLD AUTO: 51.9 %
PLATELET # BLD AUTO: 337 K/UL
POTASSIUM SERPL-SCNC: 4.2 MMOL/L
PROT SERPL-MCNC: 7.4 G/DL
RBC # BLD: 4.33 M/UL
RBC # FLD: 11.8 %
RF+CCP IGG SER-IMP: NEGATIVE
RHEUMATOID FACT SER QL: <10 IU/ML
SODIUM SERPL-SCNC: 139 MMOL/L
WBC # FLD AUTO: 8.77 K/UL

## 2022-04-08 ENCOUNTER — OUTPATIENT (OUTPATIENT)
Dept: OUTPATIENT SERVICES | Facility: HOSPITAL | Age: 52
LOS: 1 days | End: 2022-04-08
Payer: COMMERCIAL

## 2022-04-08 ENCOUNTER — APPOINTMENT (OUTPATIENT)
Dept: RADIOLOGY | Facility: IMAGING CENTER | Age: 52
End: 2022-04-08
Payer: COMMERCIAL

## 2022-04-08 DIAGNOSIS — M25.561 PAIN IN RIGHT KNEE: ICD-10-CM

## 2022-04-08 DIAGNOSIS — Z90.49 ACQUIRED ABSENCE OF OTHER SPECIFIED PARTS OF DIGESTIVE TRACT: Chronic | ICD-10-CM

## 2022-04-08 PROCEDURE — 72100 X-RAY EXAM L-S SPINE 2/3 VWS: CPT | Mod: 26

## 2022-04-08 PROCEDURE — 73565 X-RAY EXAM OF KNEES: CPT | Mod: 26

## 2022-04-08 PROCEDURE — 72100 X-RAY EXAM L-S SPINE 2/3 VWS: CPT

## 2022-04-08 PROCEDURE — 73565 X-RAY EXAM OF KNEES: CPT

## 2022-05-26 ENCOUNTER — RESULT REVIEW (OUTPATIENT)
Age: 52
End: 2022-05-26

## 2022-06-02 NOTE — ASU PATIENT PROFILE, ADULT - NS PRO AD PATIENT TYPE
Goal Outcome Evaluation:           Progress: no change  Outcome Evaluation: New admit. Given PRN morphine for c/o pain. ECHO ordered for AM.   Health Care Proxy (HCP)

## 2022-07-22 ENCOUNTER — APPOINTMENT (OUTPATIENT)
Dept: GYNECOLOGIC ONCOLOGY | Facility: CLINIC | Age: 52
End: 2022-07-22

## 2022-07-22 VITALS
BODY MASS INDEX: 28.16 KG/M2 | HEIGHT: 62 IN | SYSTOLIC BLOOD PRESSURE: 121 MMHG | WEIGHT: 153 LBS | DIASTOLIC BLOOD PRESSURE: 79 MMHG | HEART RATE: 70 BPM

## 2022-07-22 PROCEDURE — 99214 OFFICE O/P EST MOD 30 MIN: CPT

## 2023-01-13 PROBLEM — C55 ENDOMETRIOID ADENOCARCINOMA OF UTERUS: Status: ACTIVE | Noted: 2021-06-07

## 2023-01-20 ENCOUNTER — APPOINTMENT (OUTPATIENT)
Dept: GYNECOLOGIC ONCOLOGY | Facility: CLINIC | Age: 53
End: 2023-01-20
Payer: COMMERCIAL

## 2023-01-20 VITALS
DIASTOLIC BLOOD PRESSURE: 85 MMHG | WEIGHT: 150 LBS | HEIGHT: 62 IN | HEART RATE: 72 BPM | SYSTOLIC BLOOD PRESSURE: 156 MMHG | BODY MASS INDEX: 27.6 KG/M2

## 2023-01-20 DIAGNOSIS — C55 MALIGNANT NEOPLASM OF UTERUS, PART UNSPECIFIED: ICD-10-CM

## 2023-01-20 PROCEDURE — 99213 OFFICE O/P EST LOW 20 MIN: CPT

## 2023-01-20 NOTE — HISTORY OF PRESENT ILLNESS
[FreeTextEntry1] : IA gr1 endometrial cancer\par Robotic TLH/BSO/SLN 7/6/21\par \par MLH1/PMS2 deficient, MLH methylation detected\par \par Last seen 7/2022\par \par Last imaging in 2/2022 with indeterminate liver lesion, MR abdomen consistent with benign liver cyst\par Reports persistent left sided abdominal cramping which pre-dated surgery. \par Denies  dyspnea or chest pain, vaginal bleeding or discharge, nausea/vomiting, changes in bowel habits or urination, lower extremity edema or pain.\par

## 2023-01-20 NOTE — DISCUSSION/SUMMARY
[FreeTextEntry1] : Signs and symptoms of recurrence reviewed in detail, namely vaginal bleeding, abdominal pain, changes in bowel habits or urination, nausea/vomiting.\par F/u in 6 months or sooner prn\par Plan for imaging as clinically indicated\par

## 2023-04-20 ENCOUNTER — APPOINTMENT (OUTPATIENT)
Dept: RHEUMATOLOGY | Facility: CLINIC | Age: 53
End: 2023-04-20
Payer: COMMERCIAL

## 2023-04-20 VITALS
HEIGHT: 62 IN | WEIGHT: 150 LBS | RESPIRATION RATE: 15 BRPM | OXYGEN SATURATION: 99 % | HEART RATE: 62 BPM | DIASTOLIC BLOOD PRESSURE: 85 MMHG | TEMPERATURE: 97.9 F | SYSTOLIC BLOOD PRESSURE: 145 MMHG | BODY MASS INDEX: 27.6 KG/M2

## 2023-04-20 DIAGNOSIS — M79.7 FIBROMYALGIA: ICD-10-CM

## 2023-04-20 PROCEDURE — 99213 OFFICE O/P EST LOW 20 MIN: CPT

## 2023-04-20 NOTE — DATA REVIEWED
[FreeTextEntry1] : Labs reviewed from 3/22\par negative RF, CCP, ESR, CRP, DsDNA \par \par Xray of lumbar spine, knees from 4/22 \par wnl

## 2023-04-20 NOTE — ASSESSMENT
[FreeTextEntry1] : 52 y/o F w FM\par =b/l knee pain and lower back pain\par =worse w activities\par =worse in pm\par =no stiffness or swelling\par =labs 3/22 w normal ESR, CRP, RF, CCP\par =Xrays 4/22 w normal knees and lumbar spine xrays\par \par Pt wants to try PT this time. Gave PT referral. \par \par Plan\par PT referral \par RTO in 6 months

## 2023-04-20 NOTE — HISTORY OF PRESENT ILLNESS
[FreeTextEntry1] : 52 y/o F FM\par \par #FM\par =symptoms started in 2021\par =back and b/l knee pain\par =worse w activities, less in am\par =labs 3/22 w normal ESR, CRP, RF, CCP\par =Xrays 4/22 of knees, lumbar spine wnl \par =pt referred for PT, but pt did not do; referral given again 4/23\par

## 2023-04-20 NOTE — PHYSICAL EXAM
[FreeTextEntry1] : slightly obese [Auscultation Breath Sounds / Voice Sounds] : lungs were clear to auscultation bilaterally [Heart Sounds] : normal S1 and S2 [Heart Sounds Gallop] : no gallops [Murmurs] : no murmurs [Heart Sounds Pericardial Friction Rub] : no pericardial rub [Abdomen Soft] : soft [Abdomen Tenderness] : non-tender [Musculoskeletal - Swelling] : no joint swelling seen [] : no rash [Skin Lesions] : no skin lesions [Oriented To Time, Place, And Person] : oriented to person, place, and time

## 2023-12-13 ENCOUNTER — APPOINTMENT (OUTPATIENT)
Dept: PODIATRY | Facility: CLINIC | Age: 53
End: 2023-12-13

## 2024-02-10 ENCOUNTER — EMERGENCY (EMERGENCY)
Facility: HOSPITAL | Age: 54
LOS: 1 days | Discharge: HOME | End: 2024-02-10
Admitting: STUDENT IN AN ORGANIZED HEALTH CARE EDUCATION/TRAINING PROGRAM
Payer: COMMERCIAL

## 2024-02-10 VITALS
RESPIRATION RATE: 16 BRPM | DIASTOLIC BLOOD PRESSURE: 69 MMHG | TEMPERATURE: 98 F | OXYGEN SATURATION: 99 % | SYSTOLIC BLOOD PRESSURE: 114 MMHG | HEART RATE: 68 BPM

## 2024-02-10 VITALS
SYSTOLIC BLOOD PRESSURE: 136 MMHG | HEART RATE: 59 BPM | TEMPERATURE: 98 F | OXYGEN SATURATION: 100 % | DIASTOLIC BLOOD PRESSURE: 86 MMHG

## 2024-02-10 DIAGNOSIS — Z90.49 ACQUIRED ABSENCE OF OTHER SPECIFIED PARTS OF DIGESTIVE TRACT: Chronic | ICD-10-CM

## 2024-02-10 LAB
ALBUMIN SERPL ELPH-MCNC: 4.3 G/DL — SIGNIFICANT CHANGE UP (ref 3.3–5)
ALP SERPL-CCNC: 82 U/L — SIGNIFICANT CHANGE UP (ref 40–120)
ALT FLD-CCNC: 17 U/L — SIGNIFICANT CHANGE UP (ref 4–33)
ANION GAP SERPL CALC-SCNC: 9 MMOL/L — SIGNIFICANT CHANGE UP (ref 7–14)
APPEARANCE UR: CLEAR — SIGNIFICANT CHANGE UP
APPEARANCE UR: CLEAR — SIGNIFICANT CHANGE UP
AST SERPL-CCNC: 25 U/L — SIGNIFICANT CHANGE UP (ref 4–32)
BACTERIA # UR AUTO: ABNORMAL /HPF
BASOPHILS # BLD AUTO: 0.03 K/UL — SIGNIFICANT CHANGE UP (ref 0–0.2)
BASOPHILS NFR BLD AUTO: 0.4 % — SIGNIFICANT CHANGE UP (ref 0–2)
BILIRUB SERPL-MCNC: 0.3 MG/DL — SIGNIFICANT CHANGE UP (ref 0.2–1.2)
BILIRUB UR-MCNC: NEGATIVE — SIGNIFICANT CHANGE UP
BILIRUB UR-MCNC: NEGATIVE — SIGNIFICANT CHANGE UP
BUN SERPL-MCNC: 16 MG/DL — SIGNIFICANT CHANGE UP (ref 7–23)
CALCIUM SERPL-MCNC: 9.2 MG/DL — SIGNIFICANT CHANGE UP (ref 8.4–10.5)
CAST: 0 /LPF — SIGNIFICANT CHANGE UP (ref 0–4)
CHLORIDE SERPL-SCNC: 106 MMOL/L — SIGNIFICANT CHANGE UP (ref 98–107)
CO2 SERPL-SCNC: 28 MMOL/L — SIGNIFICANT CHANGE UP (ref 22–31)
COLOR SPEC: YELLOW — SIGNIFICANT CHANGE UP
COLOR SPEC: YELLOW — SIGNIFICANT CHANGE UP
CREAT SERPL-MCNC: 0.78 MG/DL — SIGNIFICANT CHANGE UP (ref 0.5–1.3)
DIFF PNL FLD: NEGATIVE — SIGNIFICANT CHANGE UP
DIFF PNL FLD: NEGATIVE — SIGNIFICANT CHANGE UP
EGFR: 90 ML/MIN/1.73M2 — SIGNIFICANT CHANGE UP
EOSINOPHIL # BLD AUTO: 0.16 K/UL — SIGNIFICANT CHANGE UP (ref 0–0.5)
EOSINOPHIL NFR BLD AUTO: 2.2 % — SIGNIFICANT CHANGE UP (ref 0–6)
GLUCOSE SERPL-MCNC: 96 MG/DL — SIGNIFICANT CHANGE UP (ref 70–99)
GLUCOSE UR QL: NEGATIVE MG/DL — SIGNIFICANT CHANGE UP
GLUCOSE UR QL: NEGATIVE MG/DL — SIGNIFICANT CHANGE UP
HCT VFR BLD CALC: 41.7 % — SIGNIFICANT CHANGE UP (ref 34.5–45)
HGB BLD-MCNC: 13.7 G/DL — SIGNIFICANT CHANGE UP (ref 11.5–15.5)
IANC: 3.82 K/UL — SIGNIFICANT CHANGE UP (ref 1.8–7.4)
IMM GRANULOCYTES NFR BLD AUTO: 0.1 % — SIGNIFICANT CHANGE UP (ref 0–0.9)
KETONES UR-MCNC: NEGATIVE MG/DL — SIGNIFICANT CHANGE UP
KETONES UR-MCNC: NEGATIVE MG/DL — SIGNIFICANT CHANGE UP
LEUKOCYTE ESTERASE UR-ACNC: ABNORMAL
LEUKOCYTE ESTERASE UR-ACNC: NEGATIVE — SIGNIFICANT CHANGE UP
LIDOCAIN IGE QN: 53 U/L — SIGNIFICANT CHANGE UP (ref 7–60)
LYMPHOCYTES # BLD AUTO: 2.77 K/UL — SIGNIFICANT CHANGE UP (ref 1–3.3)
LYMPHOCYTES # BLD AUTO: 37.9 % — SIGNIFICANT CHANGE UP (ref 13–44)
MCHC RBC-ENTMCNC: 31.1 PG — SIGNIFICANT CHANGE UP (ref 27–34)
MCHC RBC-ENTMCNC: 32.9 GM/DL — SIGNIFICANT CHANGE UP (ref 32–36)
MCV RBC AUTO: 94.6 FL — SIGNIFICANT CHANGE UP (ref 80–100)
MONOCYTES # BLD AUTO: 0.51 K/UL — SIGNIFICANT CHANGE UP (ref 0–0.9)
MONOCYTES NFR BLD AUTO: 7 % — SIGNIFICANT CHANGE UP (ref 2–14)
NEUTROPHILS # BLD AUTO: 3.82 K/UL — SIGNIFICANT CHANGE UP (ref 1.8–7.4)
NEUTROPHILS NFR BLD AUTO: 52.4 % — SIGNIFICANT CHANGE UP (ref 43–77)
NITRITE UR-MCNC: NEGATIVE — SIGNIFICANT CHANGE UP
NITRITE UR-MCNC: NEGATIVE — SIGNIFICANT CHANGE UP
NRBC # BLD: 0 /100 WBCS — SIGNIFICANT CHANGE UP (ref 0–0)
NRBC # FLD: 0 K/UL — SIGNIFICANT CHANGE UP (ref 0–0)
PH UR: 5 — SIGNIFICANT CHANGE UP (ref 5–8)
PH UR: 6.5 — SIGNIFICANT CHANGE UP (ref 5–8)
PLATELET # BLD AUTO: 336 K/UL — SIGNIFICANT CHANGE UP (ref 150–400)
POTASSIUM SERPL-MCNC: 3.4 MMOL/L — LOW (ref 3.5–5.3)
POTASSIUM SERPL-SCNC: 3.4 MMOL/L — LOW (ref 3.5–5.3)
PROT SERPL-MCNC: 7.7 G/DL — SIGNIFICANT CHANGE UP (ref 6–8.3)
PROT UR-MCNC: NEGATIVE MG/DL — SIGNIFICANT CHANGE UP
PROT UR-MCNC: NEGATIVE MG/DL — SIGNIFICANT CHANGE UP
RBC # BLD: 4.41 M/UL — SIGNIFICANT CHANGE UP (ref 3.8–5.2)
RBC # FLD: 11.6 % — SIGNIFICANT CHANGE UP (ref 10.3–14.5)
RBC CASTS # UR COMP ASSIST: 1 /HPF — SIGNIFICANT CHANGE UP (ref 0–4)
SODIUM SERPL-SCNC: 143 MMOL/L — SIGNIFICANT CHANGE UP (ref 135–145)
SP GR SPEC: 1.02 — SIGNIFICANT CHANGE UP (ref 1–1.03)
SP GR SPEC: 1.02 — SIGNIFICANT CHANGE UP (ref 1–1.03)
SQUAMOUS # UR AUTO: 15 /HPF — HIGH (ref 0–5)
UROBILINOGEN FLD QL: 0.2 MG/DL — SIGNIFICANT CHANGE UP (ref 0.2–1)
UROBILINOGEN FLD QL: 0.2 MG/DL — SIGNIFICANT CHANGE UP (ref 0.2–1)
WBC # BLD: 7.3 K/UL — SIGNIFICANT CHANGE UP (ref 3.8–10.5)
WBC # FLD AUTO: 7.3 K/UL — SIGNIFICANT CHANGE UP (ref 3.8–10.5)
WBC UR QL: 1 /HPF — SIGNIFICANT CHANGE UP (ref 0–5)

## 2024-02-10 PROCEDURE — 74176 CT ABD & PELVIS W/O CONTRAST: CPT | Mod: 26,GC,MA

## 2024-02-10 PROCEDURE — 99284 EMERGENCY DEPT VISIT MOD MDM: CPT

## 2024-02-10 RX ORDER — ONDANSETRON 8 MG/1
4 TABLET, FILM COATED ORAL ONCE
Refills: 0 | Status: COMPLETED | OUTPATIENT
Start: 2024-02-10 | End: 2024-02-10

## 2024-02-10 RX ORDER — SODIUM CHLORIDE 9 MG/ML
1000 INJECTION INTRAMUSCULAR; INTRAVENOUS; SUBCUTANEOUS ONCE
Refills: 0 | Status: COMPLETED | OUTPATIENT
Start: 2024-02-10 | End: 2024-02-10

## 2024-02-10 RX ORDER — FAMOTIDINE 10 MG/ML
20 INJECTION INTRAVENOUS ONCE
Refills: 0 | Status: COMPLETED | OUTPATIENT
Start: 2024-02-10 | End: 2024-02-10

## 2024-02-10 RX ORDER — KETOROLAC TROMETHAMINE 30 MG/ML
15 SYRINGE (ML) INJECTION ONCE
Refills: 0 | Status: DISCONTINUED | OUTPATIENT
Start: 2024-02-10 | End: 2024-02-10

## 2024-02-10 RX ADMIN — ONDANSETRON 4 MILLIGRAM(S): 8 TABLET, FILM COATED ORAL at 12:44

## 2024-02-10 RX ADMIN — FAMOTIDINE 20 MILLIGRAM(S): 10 INJECTION INTRAVENOUS at 12:44

## 2024-02-10 RX ADMIN — SODIUM CHLORIDE 1000 MILLILITER(S): 9 INJECTION INTRAMUSCULAR; INTRAVENOUS; SUBCUTANEOUS at 12:58

## 2024-02-10 RX ADMIN — Medication 15 MILLIGRAM(S): at 14:40

## 2024-02-10 RX ADMIN — Medication 30 MILLILITER(S): at 12:44

## 2024-02-10 RX ADMIN — SODIUM CHLORIDE 1000 MILLILITER(S): 9 INJECTION INTRAMUSCULAR; INTRAVENOUS; SUBCUTANEOUS at 13:58

## 2024-02-10 RX ADMIN — Medication 15 MILLIGRAM(S): at 13:48

## 2024-02-10 NOTE — ED ADULT NURSE NOTE - NSFALLUNIVINTERV_ED_ALL_ED
Bed/Stretcher in lowest position, wheels locked, appropriate side rails in place/Call bell, personal items and telephone in reach/Instruct patient to call for assistance before getting out of bed/chair/stretcher/Non-slip footwear applied when patient is off stretcher/[place] to call system/Physically safe environment - no spills, clutter or unnecessary equipment/Purposeful proactive rounding/Room/bathroom lighting operational, light cord in reach

## 2024-02-10 NOTE — ED PROVIDER NOTE - PATIENT PORTAL LINK FT
You can access the FollowMyHealth Patient Portal offered by [facility] by registering at the following website: [email or web address]. By joining [employer]’s FollowMyHealth portal, you will also be able to view your health information using other applications (apps) compatible with our system.

## 2024-02-10 NOTE — ED PROVIDER NOTE - NSFOLLOWUPINSTRUCTIONS_ED_ALL_ED_FT
Please follow up with your primary care provider, and you may follow up with gastroenterology, resources provided to you.     Abdominal Pain    Many things can cause abdominal pain. Many times, abdominal pain is not caused by a disease and will improve without treatment. Your health care provider will do a physical exam to determine if there is a dangerous cause of your pain; blood tests and imaging may help determine the cause of your pain. However, in many cases, no cause may be found and you may need further testing as an outpatient. Monitor your abdominal pain for any changes.     SEEK IMMEDIATE MEDICAL CARE IF YOU HAVE ANY OF THE FOLLOWING SYMPTOMS: worsening abdominal pain, uncontrollable vomiting, profuse diarrhea, inability to have bowel movements or pass gas, black or bloody stools, fever accompanying chest pain or back pain, or fainting. These symptoms may represent a serious problem that is an emergency. Do not wait to see if the symptoms will go away. Get medical help right away. Call 911 and do not drive yourself to the hospital.      Muscle Pain, Adult      Muscle pain, also called myalgia, is a condition in which a person has pain in one or more muscles in the body. Muscle pain may be mild, moderate, or severe. It may feel sharp, achy, or burning. In most cases, the pain lasts only a short time and goes away without treatment.    Muscle pain can result from using muscles in a new or different way or after a period of inactivity. It is normal to feel some muscle pain after starting an exercise program. Muscles that have not been used often will be sore at first.      What are the causes?  This condition is caused by using muscles in a new or different way after a period of inactivity. Other causes may include:  •Overuse or muscle strain, especially if you are not in shape. This is the most common cause of muscle pain.      •Injury or bruising.      •Infectious diseases, including diseases caused by viruses, such as the flu (influenza).      •Fibromyalgia.This is a long-term, or chronic, condition that causes muscle tenderness, tiredness (fatigue), and headache.      •Autoimmune or rheumatologic diseases. These are conditions, such as lupus, in which the body's defense system (immunesystem) attacks areas in the body.      •Certain medicines, including ACE inhibitors and statins.        What are the signs or symptoms?    The main symptom of this condition is sore or painful muscles, including during activity and when stretching. You may also have slight swelling.      How is this diagnosed?    This condition is diagnosed with a physical exam. Your health care provider will ask questions about your pain and when it began. If you have not had muscle pain for very long, your health care provider may want to wait before doing much testing. If your muscle pain has lasted a long time, tests may be done right away. In some cases, this may include tests to rule out certain conditions or illnesses.      How is this treated?    Treatment for this condition depends on the cause. Home care is often enough to relieve muscle pain. Your health care provider may also prescribe NSAIDs, such as ibuprofen.      Follow these instructions at home:    Medicines     •Take over-the-counter and prescription medicines only as told by your health care provider.      •Ask your health care provider if the medicine prescribed to you requires you to avoid driving or using machinery.        Managing pain, swelling, and discomfort                 •If directed, put ice on the painful area. To do this:  •Put ice in a plastic bag.      •Place a towel between your skin and the bag.      •Leave the ice on for 20 minutes, 2–3 times a day.      •For the first 2 days of muscle soreness, or if there is swelling:  •Do not soak in hot baths.      •Do not use a hot tub, steam room, sauna, heating pad, or other heat source.      •After 48–72 hours, you may alternate between applying ice and applying heat as told by your health care provider. If directed, apply heat to the affected area as often as told by your health care provider. Use the heat source that your health care provider recommends, such as a moist heat pack or a heating pad.  •Place a towel between your skin and the heat source.      •Leave the heat on for 20–30 minutes.      •Remove the heat if your skin turns bright red. This is especially important if you are unable to feel pain, heat, or cold. You may have a greater risk of getting burned.        •If you have an injury, raise (elevate) the injured area above the level of your heart while you are sitting or lying down.        Activity    •If overuse is causing your muscle pain:  •Slow down your activities until the pain goes away.      •Do regular, gentle exercises if you are not usually active.      •Warm up before exercising. Stretch before and after exercising. This can help lower the risk of muscle pain.        • Do not continue working out if the pain is severe. Severe pain could mean that you have injured a muscle.      • Do not lift anything that is heavier than 5–10 lb (2.3–4.5 kg), or the limit that you are told, until your health care provider says that it is safe.      •Return to your normal activities as told by your health care provider. Ask your health care provider what activities are safe for you.      General instructions     • Do not use any products that contain nicotine or tobacco, such as cigarettes, e-cigarettes, and chewing tobacco. These can delay healing. If you need help quitting, ask your health care provider.      •Keep all follow-up visits as told by your health care provider. This is important.        Contact a health care provider if you have:    •Muscle pain that gets worse and medicines do not help.      •Muscle pain that lasts longer than 3 days.      •A rash or fever along with muscle pain.      •Muscle pain after a tick bite.      •Muscle pain while working out, even though you are in good physical condition.      •Redness, soreness, or swelling along with muscle pain.      •Muscle pain after starting a new medicine or changing the dose of a medicine.        Get help right away if you have:    •Trouble breathing.      •Trouble swallowing.      •Muscle pain along with a stiff neck, fever, and vomiting.      •Severe muscle weakness or you cannot move part of your body.      These symptoms may represent a serious problem that is an emergency. Do not wait to see if the symptoms will go away. Get medical help right away. Call your local emergency services (911 in the [place] Do not drive yourself to the hospital.       Summary    •Muscle pain usually lasts only a short time and goes away without treatment.      •This condition is caused by using muscles in a new or different way after a period of inactivity.      •If your muscle pain lasts longer than 3 days, tell your health care provider.      This information is not intended to replace advice given to you by your health care provider. Make sure you discuss any questions you have with your health care provider.

## 2024-02-10 NOTE — ED PROVIDER NOTE - OBJECTIVE STATEMENT
This is a53 yr old F, psx cholecystectomy ( 2010) with c/o epigastric pain, radiation to her back for the last 1 week, worsening with food and when she lays down. This is a 53 yr old F, psx cholecystectomy ( 2010) with c/o epigastric pain, radiation to her back for the last 1 week, worsening with food and when she lays down. Denies injury for trauma  denies fever chills shortness of breath chest pain headache dizziness lightheadedness urinary urgency frequency vaginal discharge blood in the urine or stool.

## 2024-02-10 NOTE — ED PROVIDER NOTE - CLINICAL SUMMARY MEDICAL DECISION MAKING FREE TEXT BOX
This is a 53 yr old F, psx cholecystectomy ( 2010) with c/o epigastric pain, radiation to her back for the last 1 week, worsening with food and when she lays down. Denies injury for trauma  denies fever chills shortness of breath chest pain headache dizziness lightheadedness urinary urgency frequency vaginal discharge blood in the urine or stool.   symptom management, reeval- c/o pain , ct renal - negative findings

## 2024-02-10 NOTE — ED ADULT NURSE NOTE - OBJECTIVE STATEMENT
Phone: Timothy           Fax: [phone]                           Outpatient Physical Therapy                                                                            Daily Note    Patient: Roseline Mcdonald : 1947  [facility] #: [id number]   Referring Practitioner:  ELIZ Leach    Referral Date : 21     Date: 2021    Diagnosis: Lumbar back pain, M54.5  Treatment Diagnosis: Low back pain    Onset Date: 21  PT Insurance Information: Medicare/[employer] supplement  Total # of Visits Approved: 12 Per Physician Order  Total # of Visits to Date: 2  No Show: 0  Canceled Appointment: 0    Pre-Treatment Pain:  5/10  Subjective: Pt reports pain is still a 5/10. Pt states her core/ abs are feeling a little better but shes still having back/ neck pain. Exercises:  Exercise 2: SciFit Bike 6 min  Exercise 3: ball rollouts 3x30  Exercise 4: ball ab iso 10x 10 sec  Exercise 5: Bridges 10x  Exercise 6: PPT, PPT march 10x ea  Exercise 7: LTR 15x    Manual:  Manual traction: Supine manual lumbar traction with therapy ball to relieve back pain    Modalities:  Moist heat: HP with IFC x15 min to lower and mid back to decrease pain and tone  E-stim (parameters): IFC x15 min in sitting to decrease pain in lower back     Assessment  Assessment: Initiated lgiht core strengthening and pelvic stability exercises today with fair tolerance noted. Pt reports pain relief with manual traction. Will continue to progress as tolerable. Activity Tolerance  Activity Tolerance: Patient Tolerated treatment well    Patient Education  Patient Education: New ex rationale. Pt verbalized/demonstrated good understanding:     [x] Yes         [] No, pt required further clarification.      Post Treatment Pain:  4/10    Plan  Times per week: 2  Plan weeks: 6    Goals  (Total # of Visits to Date: 2)      Short term goals  Time Frame for Short term goals: 3 weeks  Short term goal 1:
53 year old female received to wellness c/o RUQ pain radiating to right flank with nausea x 1 wk. pt denies dysuria, hematuria, fevers. Last BM yesterday was normal for her. pt rates pain 5/10 at this time described as "sharp". respirations even and unlabored. abd soft, nondistended, nontender to palpation. PIV #20 left AC, labs drawn and sent. UA sent. vs as noted. meds given as ordered

## 2024-02-10 NOTE — ED PROVIDER NOTE - PROGRESS NOTE DETAILS
NP Davis- Reevaluated patient at bedside.  Patient feeling much improved.  Discussed the results of all diagnostic testing in ED and copies of all reports given.   An opportunity to ask questions was given.  Discussed the importance of prompt, close medical follow-up.  Patient will return with any changes, concerns or persistent / worsening symptoms.  Understanding of all instructions verbalized.

## 2024-02-11 LAB
CULTURE RESULTS: SIGNIFICANT CHANGE UP
SPECIMEN SOURCE: SIGNIFICANT CHANGE UP

## 2024-04-11 NOTE — ASU PREOP CHECKLIST - COMMENTS
As discussed today, we have reviewed, among other things, your condition, medications with potential side effects, labs and course of action today.   Feel free to contact me if you have any questions.    Here is a summary of the recommendations:  -Lifestyle Modification  -Avoid the following that can worsen reflux: coffee, tea, soda, chocolate, peppermint, greasy food, spicy food, citrus  -Avoid eating 3-4 hours before sleeping  -Avoid lying down 2-4 hours after eating.  -Avoid non-steroidal anti-inflammatory medications.(NSAIDS) - these include ibuprofen, advil, alleve, naproxen  -Use a bed wedge when sleeping to elevate the back on a slope  -Weight loss  -Take acid reducing medication omeprazole 20mg daily correctly 30 minutes before your meal. When taking the PPI's (proton pump inhibitors), the box will say to take for 14 days. This is only a GUIDELINE. Please take as we suggest to get maximal benefit.   -EGD to be scheduled for gerd and dysphagia  -drink water to lubricate throat  -colon waitlist to 7/2026   Pepcid sip of water

## 2024-04-23 ENCOUNTER — APPOINTMENT (OUTPATIENT)
Dept: PODIATRY | Facility: CLINIC | Age: 54
End: 2024-04-23
Payer: COMMERCIAL

## 2024-04-23 DIAGNOSIS — M21.271 FLEXION DEFORMITY, RIGHT ANKLE AND TOES: ICD-10-CM

## 2024-04-23 PROCEDURE — 99203 OFFICE O/P NEW LOW 30 MIN: CPT

## 2024-04-23 PROCEDURE — 73630 X-RAY EXAM OF FOOT: CPT | Mod: RT

## 2024-04-24 NOTE — REASON FOR VISIT
[Follow-Up Visit] : a follow-up visit for [Foot Pain] : foot pain [Initial Visit] : an initial visit for

## 2024-04-25 NOTE — PHYSICAL EXAM
[2+] : left foot dorsalis pedis 2+ [No Joint Swelling] : no joint swelling [Skin Color & Pigmentation] : normal skin color and pigmentation [Skin Turgor] : normal skin turgor [Skin Lesions] : no skin lesions [Sensation] : the sensory exam was normal to light touch and pinprick [No Focal Deficits] : no focal deficits [Deep Tendon Reflexes (DTR)] : deep tendon reflexes were 2+ and symmetric [Motor Exam] : the motor exam was normal [General Appearance - Alert] : alert [Oriented To Time, Place, And Person] : oriented to person, place, and time [Ankle Swelling (On Exam)] : not present [Varicose Veins Of Lower Extremities] : not present [] : not present [Delayed in the Right Toes] : capillary refills normal in right toes [Delayed in the Left Toes] : capillary refills normal in the left toes [de-identified] : Forefoot varus, fully compensated.  HAV with bunion.  Metatarsus primus adductus.  Mild dorsiflexion of the 1st ray.  No significant limitation of motion at the 1st MPJ.  [Foot Ulcer] : no foot ulcer [Skin Induration] : no skin induration

## 2024-04-25 NOTE — CONSULT LETTER
[Dear  ___] : Dear  [unfilled], [Courtesy Letter:] : I had the pleasure of seeing your patient, [unfilled], in my office today. [Please see my note below.] : Please see my note below. [Consult Closing:] : Thank you very much for allowing me to participate in the care of this patient.  If you have any questions, please do not hesitate to contact me. [Sincerely,] : Sincerely, [FreeTextEntry2] : Will Stacy MD [address] [place] [address]  [FreeTextEntry3] : Charles M. Lombardi, DPM, FACFAS Systems Chief, Podiatric Services [facility] Assistant Professor of Surgery [employer] [facility] [employer] [facility]

## 2024-04-25 NOTE — ASSESSMENT
[FreeTextEntry1] : Impression: HAV (M20.11) with bunion (M21.611), bilateral, right is much more painful.  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  Mild elevation of the 1st ray (M21.271).    Recommendations: Tushar-type bunionectomy with a plantar flexory component.  This was explained to the patient including the risks, complications and alternatives.  Will schedule surgery at the beginning of summer when she is off from school.  She will come back for a more in-depth consultation and discussion.  She was advised to write down any further questions she might have and will answer at that time.

## 2024-04-25 NOTE — PROCEDURE
[FreeTextEntry1] : X-rays were taken to evaluate the deformities. (DP, medial oblique, lateral - right foot) Increased intermetatarsal angle; approximately 13 degrees.  Increased hallux abduction.  Mild shift of the sesamoids.  Mild elevation of the 1st ray.

## 2024-04-25 NOTE — HISTORY OF PRESENT ILLNESS
[FreeTextEntry1] : Patient presents today with a chief complaint of painful bunion, on both feet but the right foot is much more symptomatic.  She has attempted conservative measures in the past including padding, shoe gear modifications without relief of symptomatology.  Patient has difficulty ambulating due to pain and discomfort.  This is interfering with her ability to function daily.   No

## 2024-06-19 PROBLEM — Z78.9 OTHER SPECIFIED HEALTH STATUS: Chronic | Status: ACTIVE | Noted: 2020-02-23

## 2024-06-24 ENCOUNTER — OUTPATIENT (OUTPATIENT)
Dept: OUTPATIENT SERVICES | Facility: HOSPITAL | Age: 54
LOS: 1 days | End: 2024-06-24
Payer: COMMERCIAL

## 2024-06-24 VITALS
SYSTOLIC BLOOD PRESSURE: 129 MMHG | WEIGHT: 156.09 LBS | RESPIRATION RATE: 17 BRPM | HEART RATE: 63 BPM | OXYGEN SATURATION: 100 % | DIASTOLIC BLOOD PRESSURE: 86 MMHG | TEMPERATURE: 98 F | HEIGHT: 61 IN

## 2024-06-24 DIAGNOSIS — Z01.818 ENCOUNTER FOR OTHER PREPROCEDURAL EXAMINATION: ICD-10-CM

## 2024-06-24 DIAGNOSIS — M20.11 HALLUX VALGUS (ACQUIRED), RIGHT FOOT: ICD-10-CM

## 2024-06-24 DIAGNOSIS — Z90.710 ACQUIRED ABSENCE OF BOTH CERVIX AND UTERUS: Chronic | ICD-10-CM

## 2024-06-24 DIAGNOSIS — K08.409 PARTIAL LOSS OF TEETH, UNSPECIFIED CAUSE, UNSPECIFIED CLASS: Chronic | ICD-10-CM

## 2024-06-24 DIAGNOSIS — Z98.890 OTHER SPECIFIED POSTPROCEDURAL STATES: Chronic | ICD-10-CM

## 2024-06-24 DIAGNOSIS — Z90.49 ACQUIRED ABSENCE OF OTHER SPECIFIED PARTS OF DIGESTIVE TRACT: Chronic | ICD-10-CM

## 2024-06-24 LAB
ANION GAP SERPL CALC-SCNC: 14 MMOL/L — SIGNIFICANT CHANGE UP (ref 5–17)
BUN SERPL-MCNC: 16 MG/DL — SIGNIFICANT CHANGE UP (ref 7–23)
CALCIUM SERPL-MCNC: 9.8 MG/DL — SIGNIFICANT CHANGE UP (ref 8.4–10.5)
CHLORIDE SERPL-SCNC: 102 MMOL/L — SIGNIFICANT CHANGE UP (ref 96–108)
CO2 SERPL-SCNC: 26 MMOL/L — SIGNIFICANT CHANGE UP (ref 22–31)
CREAT SERPL-MCNC: 0.77 MG/DL — SIGNIFICANT CHANGE UP (ref 0.5–1.3)
EGFR: 91 ML/MIN/1.73M2 — SIGNIFICANT CHANGE UP
GLUCOSE SERPL-MCNC: 92 MG/DL — SIGNIFICANT CHANGE UP (ref 70–99)
HCT VFR BLD CALC: 38.5 % — SIGNIFICANT CHANGE UP (ref 34.5–45)
HGB BLD-MCNC: 12.9 G/DL — SIGNIFICANT CHANGE UP (ref 11.5–15.5)
MCHC RBC-ENTMCNC: 31.5 PG — SIGNIFICANT CHANGE UP (ref 27–34)
MCHC RBC-ENTMCNC: 33.5 GM/DL — SIGNIFICANT CHANGE UP (ref 32–36)
MCV RBC AUTO: 94.1 FL — SIGNIFICANT CHANGE UP (ref 80–100)
NRBC # BLD: 0 /100 WBCS — SIGNIFICANT CHANGE UP (ref 0–0)
PLATELET # BLD AUTO: 311 K/UL — SIGNIFICANT CHANGE UP (ref 150–400)
POTASSIUM SERPL-MCNC: 3.8 MMOL/L — SIGNIFICANT CHANGE UP (ref 3.5–5.3)
POTASSIUM SERPL-SCNC: 3.8 MMOL/L — SIGNIFICANT CHANGE UP (ref 3.5–5.3)
RBC # BLD: 4.09 M/UL — SIGNIFICANT CHANGE UP (ref 3.8–5.2)
RBC # FLD: 11.6 % — SIGNIFICANT CHANGE UP (ref 10.3–14.5)
SODIUM SERPL-SCNC: 142 MMOL/L — SIGNIFICANT CHANGE UP (ref 135–145)
WBC # BLD: 8.73 K/UL — SIGNIFICANT CHANGE UP (ref 3.8–10.5)
WBC # FLD AUTO: 8.73 K/UL — SIGNIFICANT CHANGE UP (ref 3.8–10.5)

## 2024-06-24 PROCEDURE — G0463: CPT

## 2024-06-24 PROCEDURE — 80048 BASIC METABOLIC PNL TOTAL CA: CPT

## 2024-06-24 PROCEDURE — 85027 COMPLETE CBC AUTOMATED: CPT

## 2024-06-24 RX ORDER — PREGABALIN 225 MG/1
1 CAPSULE ORAL
Refills: 0 | DISCHARGE

## 2024-06-25 PROBLEM — Z78.9 OTHER SPECIFIED HEALTH STATUS: Chronic | Status: INACTIVE | Noted: 2020-02-23 | Resolved: 2024-06-24

## 2024-07-02 ENCOUNTER — APPOINTMENT (OUTPATIENT)
Dept: PODIATRY | Facility: CLINIC | Age: 54
End: 2024-07-02
Payer: COMMERCIAL

## 2024-07-02 DIAGNOSIS — M21.611 BUNION OF RIGHT FOOT: ICD-10-CM

## 2024-07-02 DIAGNOSIS — Q66.219 UNSP FOOT CONGEN METATARSUS PRIMUS VARUS: ICD-10-CM

## 2024-07-02 DIAGNOSIS — M20.11 HALLUX VALGUS (ACQUIRED), RIGHT FOOT: ICD-10-CM

## 2024-07-02 PROCEDURE — 99213 OFFICE O/P EST LOW 20 MIN: CPT

## 2024-07-08 ENCOUNTER — APPOINTMENT (OUTPATIENT)
Dept: PODIATRY | Facility: HOSPITAL | Age: 54
End: 2024-07-08

## 2024-09-26 NOTE — ED PROVIDER NOTE - NSICDXFAMILYHX_GEN_ALL_CORE_FT
2024    Melvin Fernandes MD  [address]  [facility]     Patient: Pati Meyers   YOB: 1969   Date of Visit: 2024     Encounter Diagnosis     ICD-10-CM    1. Chronic pain of right knee  M25.561     G89.29       2. Osteoarthritis of right knee, unspecified osteoarthritis type  M17.11       3. Pre-op evaluation  Z01.818           Dear Dr. Fernandes:    Thank you for your recent referral of Pati Meyers. Please review the attached evaluation summary from Pati's recent visit.     Please verify that you agree with the plan of care by signing the attached order.     If you have any questions or concerns, please do not hesitate to call.     I sincerely appreciate the opportunity to share in the care of one of your patients and hope to have another opportunity to work with you in the near future.       Sincerely,    Sorin Aggarwal, PT      Referring Provider:      I certify that I have read the below Plan of Care and certify the need for these services furnished under this plan of treatment while under my care.                    Melvin Fernandes MD  [address]  [facility]   Via Fax: [phone]          PT Evaluation - Pre-Op Evaluation    Today's date: 2024  Patient name: Pati Meyers  : 1969  MRN: [id number]  Referring provider: Melvin Fernandes MD  Dx:   Encounter Diagnosis     ICD-10-CM    1. Chronic pain of right knee  M25.561     G89.29       2. Osteoarthritis of right knee, unspecified osteoarthritis type  M17.11       3. Pre-op evaluation  Z01.818           Start Time: 745  Stop Time: 830  Total time in clinic (min): 45 minutes    Assessment  Impairments: abnormal coordination, abnormal gait, abnormal muscle firing, abnormal muscle tone, abnormal or restricted ROM, activity intolerance, impaired balance, impaired physical strength, lacks appropriate home exercise program, pain with function,  weight-bearing intolerance, poor posture  and poor body mechanics  Functional limitations: bending, lifting, carrying, walking, stair negotiation, transfers, bed mobility  Symptom irritability: high    Assessment details: Pati Meyers is a 55 y.o. female presenting for pre-op examination of R TKA scheduled for 10/3/2024.  Primary impairments include R knee pain with functional activities, RLE weakness, R knee flexion AROM dysfunction, quadriceps motor control dysfunction.  Educated pt on anatomy and physiology of diagnosis.  Will benefit from skilled PT interventions for community reintegration, ADL management/independence, return to work/hobbies.  Provided pt with written home exercise program to be completed daily.    Understanding of Dx/Px/POC: good     Prognosis: good    Goals    Short Term Goals:  In 6 weeks, the patient will:  1. Improve R knee extension to 0 degrees  2. Improve R knee flexion AROM to at least 110 degrees  3. Supervision with HEP for self-care    Long Term Goals:  In 12 weeks, the patient will:  1. Improve R hip flexion and R knee extension strength to at least 4+/5 MMT  2. FOTO to greater than predicted value  3. Independent with HEP for self-care      Plan  Patient would benefit from: skilled physical therapy  Planned modality interventions: manual electrical stimulation and cryotherapy    Planned therapy interventions: abdominal trunk stabilization, activity modification, balance, balance/weight bearing training, behavior modification, body mechanics training, community reintegration, coordination, fine motor coordination training, flexibility, functional ROM exercises, gait training, graded activity, graded exercise, graded motor, home exercise program, work reintegration, therapeutic training, therapeutic exercise, therapeutic activities, stretching, strengthening, self care, postural training, patient education, neuromuscular re-education, motor coordination training, massage,  "manual therapy, joint mobilization and ADL training    Frequency: 2-3x week  Duration in weeks: 12  Plan of Care beginning date: 9/26/2024  Plan of Care expiration date: 12/19/2024  Treatment plan discussed with: patient      Subjective    Pain Location: R medial knee, L lateral knee  Pain Intensity: at rest 4/10, worst 8/10 - constant dull ache, stabbing with stair negotiation  NAN: R TKA  DOI: 10/3/2024  Aggravating Factors: driving, steps, standing, walking, unsteady carrying things, bending over, bed mobility - rolling  Alleviating Factors: oral voltaren, topical voltaren, ice/heat, sleep with compression sleeves  Living Situation: unable to get in bathtub, independent ADLs  Constitutional S/S: lateral R knee n/t - ever since iovera treatment about 2 weeks ago  Goals: \"I would like to be active again, walk and do things with my , lose some weight\"  PLOF: also scheduled for L TKA in December      Objective  Standing Posture & Lower Extremity Alignment:  Structure/Joint         Pelvis (Tilt)  Anterior  Neutral x Posterior   Iliac Crests  Right Elevated x Neutral  Left Elevated   Knee - Frontal  x Genuvalgum  Neutral  Genuvarum   Knee - Sagittal  Genurecurvatum x Neutral     Rearfoot  Valgus x Neutral  Varus   Forefoot  Abducted x Neutral     Arch x Pes Planus  Neutral  Pes Cavus     Range of Motion: Goniometric revealed the following findings (in degrees).  Joint Motion Right: 9/26/2024 Left: 9/26/2024   Knee Extension 0 0   Knee Flexion 105 107     Strength: MMT revealed the following findings.  Joint Motion Right: 9/26/2024 Left: 9/26/2024   Hip Flexion 3+/5 3+/5   Hip Abduction 3+/5 3+/5   Hip Adduction 3+/5 3+/5   Hip Extension 3+/5 3+/5   Knee Extension 3+/5 3+/5   Knee Flexion 3+/5 3+/5   Ankle Plantarflexion 4/5 4/5   Ankle Dorsiflexion 4/5 4/5     Additional Assessments:  Palpation: mild TTP medial R knee  Gait Pattern: antalgic  Balance: impaired  TUG: 10.44 seconds - UE use on STS descent    Risk " Assessment and Prediction Tool results reviewed. Patient reported functional outcome scores reviewed. Discussed DOS and patient's questions were answered to patient's satisfaction. Mobility/ROM results per above. Strength results per above. Balance/Gait (including Timed Up & Go) per above. Virtual Home Assessment was reviewed with patient. Home Preparation Checklist was reviewed with patient including identification of care partner and encouragement of single level set-up. Post-operative pain management expectations discussed to the patient's satisfaction. Post-operative gait training for level ground, stairs, and car transfers was performed. Patient demonstrated competence with immediate post-operative home exercise program.           Precautions: R TKA 10/3/2024    POC expires Unit limit Auth Expiration date PT/OT + Visit Limit?   12/19/24 BOMN N/A BOMN     Visit/Unit Tracking  AUTH Status:  Date 9/26         Used 1         Remaining             Pertinent Findings:                                                                                            Test / Measure  9/26/2024   FOTO Pre-Op (Predicted 59) 40   R knee extension MMT 3+/5   R hip flexion MMT 3+/5   R knee ROM range 0-105   TUG 10.44s UE use on descent         Manuals 9/26            R knee PROM, stretching                                                    Neuro Re-Ed             Quad sets             Glute sets             Heel slides             Mini squats             QS + SLR                                       Ther Ex             HEP review / edu 10'            Rec bike             SAQ             LAQ             HR/TR             Gastroc S             Stad hip 3 way                          Ther Activity             STS             FSU             LSU                                       Gait Training                                       Modalities                                                             FAMILY HISTORY:  Father  Still living? Yes, Estimated age: Age Unknown  FH: skin cancer, Age at diagnosis: Age Unknown

## 2024-10-02 ENCOUNTER — EMERGENCY (EMERGENCY)
Facility: HOSPITAL | Age: 54
LOS: 1 days | Discharge: HOME | End: 2024-10-02
Attending: EMERGENCY MEDICINE | Admitting: EMERGENCY MEDICINE
Payer: COMMERCIAL

## 2024-10-02 VITALS
HEART RATE: 70 BPM | DIASTOLIC BLOOD PRESSURE: 77 MMHG | TEMPERATURE: 98 F | SYSTOLIC BLOOD PRESSURE: 127 MMHG | OXYGEN SATURATION: 97 % | WEIGHT: 145.95 LBS | HEIGHT: 61 IN | RESPIRATION RATE: 16 BRPM

## 2024-10-02 DIAGNOSIS — Z90.710 ACQUIRED ABSENCE OF BOTH CERVIX AND UTERUS: Chronic | ICD-10-CM

## 2024-10-02 DIAGNOSIS — Z90.49 ACQUIRED ABSENCE OF OTHER SPECIFIED PARTS OF DIGESTIVE TRACT: Chronic | ICD-10-CM

## 2024-10-02 DIAGNOSIS — K08.409 PARTIAL LOSS OF TEETH, UNSPECIFIED CAUSE, UNSPECIFIED CLASS: Chronic | ICD-10-CM

## 2024-10-02 DIAGNOSIS — Z98.890 OTHER SPECIFIED POSTPROCEDURAL STATES: Chronic | ICD-10-CM

## 2024-10-02 PROBLEM — Z87.39 PERSONAL HISTORY OF OTHER DISEASES OF THE MUSCULOSKELETAL SYSTEM AND CONNECTIVE TISSUE: Chronic | Status: ACTIVE | Noted: 2024-06-24

## 2024-10-02 PROBLEM — K76.9 LIVER DISEASE, UNSPECIFIED: Chronic | Status: ACTIVE | Noted: 2024-06-24

## 2024-10-02 PROBLEM — C55 MALIGNANT NEOPLASM OF UTERUS, PART UNSPECIFIED: Chronic | Status: ACTIVE | Noted: 2024-06-24

## 2024-10-02 PROBLEM — M25.561 PAIN IN RIGHT KNEE: Chronic | Status: ACTIVE | Noted: 2024-06-24

## 2024-10-02 LAB
ALBUMIN SERPL ELPH-MCNC: 4.4 G/DL — SIGNIFICANT CHANGE UP (ref 3.3–5)
ALP SERPL-CCNC: 74 U/L — SIGNIFICANT CHANGE UP (ref 40–120)
ALT FLD-CCNC: 17 U/L — SIGNIFICANT CHANGE UP (ref 4–33)
ANION GAP SERPL CALC-SCNC: 14 MMOL/L — SIGNIFICANT CHANGE UP (ref 7–14)
APPEARANCE UR: CLEAR — SIGNIFICANT CHANGE UP
AST SERPL-CCNC: 26 U/L — SIGNIFICANT CHANGE UP (ref 4–32)
BASOPHILS # BLD AUTO: 0.03 K/UL — SIGNIFICANT CHANGE UP (ref 0–0.2)
BASOPHILS NFR BLD AUTO: 0.4 % — SIGNIFICANT CHANGE UP (ref 0–2)
BILIRUB SERPL-MCNC: 0.7 MG/DL — SIGNIFICANT CHANGE UP (ref 0.2–1.2)
BILIRUB UR-MCNC: NEGATIVE — SIGNIFICANT CHANGE UP
BUN SERPL-MCNC: 14 MG/DL — SIGNIFICANT CHANGE UP (ref 7–23)
CALCIUM SERPL-MCNC: 9.6 MG/DL — SIGNIFICANT CHANGE UP (ref 8.4–10.5)
CHLORIDE SERPL-SCNC: 104 MMOL/L — SIGNIFICANT CHANGE UP (ref 98–107)
CO2 SERPL-SCNC: 23 MMOL/L — SIGNIFICANT CHANGE UP (ref 22–31)
COLOR SPEC: YELLOW — SIGNIFICANT CHANGE UP
CREAT SERPL-MCNC: 0.73 MG/DL — SIGNIFICANT CHANGE UP (ref 0.5–1.3)
DIFF PNL FLD: NEGATIVE — SIGNIFICANT CHANGE UP
EGFR: 97 ML/MIN/1.73M2 — SIGNIFICANT CHANGE UP
EGFR: 97 ML/MIN/1.73M2 — SIGNIFICANT CHANGE UP
EOSINOPHIL # BLD AUTO: 0.13 K/UL — SIGNIFICANT CHANGE UP (ref 0–0.5)
EOSINOPHIL NFR BLD AUTO: 1.5 % — SIGNIFICANT CHANGE UP (ref 0–6)
GLUCOSE SERPL-MCNC: 79 MG/DL — SIGNIFICANT CHANGE UP (ref 70–99)
GLUCOSE UR QL: NEGATIVE MG/DL — SIGNIFICANT CHANGE UP
HCT VFR BLD CALC: 41.2 % — SIGNIFICANT CHANGE UP (ref 34.5–45)
HGB BLD-MCNC: 13.8 G/DL — SIGNIFICANT CHANGE UP (ref 11.5–15.5)
IANC: 4.34 K/UL — SIGNIFICANT CHANGE UP (ref 1.8–7.4)
IMM GRANULOCYTES NFR BLD AUTO: 0.6 % — SIGNIFICANT CHANGE UP (ref 0–0.9)
KETONES UR-MCNC: ABNORMAL MG/DL
LEUKOCYTE ESTERASE UR-ACNC: NEGATIVE — SIGNIFICANT CHANGE UP
LIDOCAIN IGE QN: 42 U/L — SIGNIFICANT CHANGE UP (ref 7–60)
LYMPHOCYTES # BLD AUTO: 3.24 K/UL — SIGNIFICANT CHANGE UP (ref 1–3.3)
LYMPHOCYTES # BLD AUTO: 38.3 % — SIGNIFICANT CHANGE UP (ref 13–44)
MCHC RBC-ENTMCNC: 31.2 PG — SIGNIFICANT CHANGE UP (ref 27–34)
MCHC RBC-ENTMCNC: 33.5 GM/DL — SIGNIFICANT CHANGE UP (ref 32–36)
MCV RBC AUTO: 93.2 FL — SIGNIFICANT CHANGE UP (ref 80–100)
MONOCYTES # BLD AUTO: 0.66 K/UL — SIGNIFICANT CHANGE UP (ref 0–0.9)
MONOCYTES NFR BLD AUTO: 7.8 % — SIGNIFICANT CHANGE UP (ref 2–14)
NEUTROPHILS # BLD AUTO: 4.34 K/UL — SIGNIFICANT CHANGE UP (ref 1.8–7.4)
NEUTROPHILS NFR BLD AUTO: 51.4 % — SIGNIFICANT CHANGE UP (ref 43–77)
NITRITE UR-MCNC: NEGATIVE — SIGNIFICANT CHANGE UP
NRBC # BLD AUTO: 0 K/UL — SIGNIFICANT CHANGE UP (ref 0–0)
NRBC # BLD: 0 /100 WBCS — SIGNIFICANT CHANGE UP (ref 0–0)
NRBC # FLD: 0 K/UL — SIGNIFICANT CHANGE UP (ref 0–0)
NRBC BLD-RTO: 0 /100 WBCS — SIGNIFICANT CHANGE UP (ref 0–0)
PH UR: 5.5 — SIGNIFICANT CHANGE UP (ref 5–8)
PLATELET # BLD AUTO: 297 K/UL — SIGNIFICANT CHANGE UP (ref 150–400)
POTASSIUM SERPL-MCNC: 4.3 MMOL/L — SIGNIFICANT CHANGE UP (ref 3.5–5.3)
POTASSIUM SERPL-SCNC: 4.3 MMOL/L — SIGNIFICANT CHANGE UP (ref 3.5–5.3)
PROT SERPL-MCNC: 7.5 G/DL — SIGNIFICANT CHANGE UP (ref 6–8.3)
PROT UR-MCNC: NEGATIVE MG/DL — SIGNIFICANT CHANGE UP
RBC # BLD: 4.42 M/UL — SIGNIFICANT CHANGE UP (ref 3.8–5.2)
RBC # FLD: 11.5 % — SIGNIFICANT CHANGE UP (ref 10.3–14.5)
SODIUM SERPL-SCNC: 141 MMOL/L — SIGNIFICANT CHANGE UP (ref 135–145)
SP GR SPEC: 1.02 — SIGNIFICANT CHANGE UP (ref 1–1.03)
UROBILINOGEN FLD QL: 0.2 MG/DL — SIGNIFICANT CHANGE UP (ref 0.2–1)
WBC # BLD: 8.45 K/UL — SIGNIFICANT CHANGE UP (ref 3.8–10.5)
WBC # FLD AUTO: 8.45 K/UL — SIGNIFICANT CHANGE UP (ref 3.8–10.5)

## 2024-10-02 PROCEDURE — 99285 EMERGENCY DEPT VISIT HI MDM: CPT

## 2024-10-02 PROCEDURE — 74177 CT ABD & PELVIS W/CONTRAST: CPT | Mod: 26,MC

## 2024-10-02 RX ORDER — SODIUM CHLORIDE 9 G/1000ML
1000 INJECTION, SOLUTION INTRAVENOUS ONCE
Refills: 0 | Status: COMPLETED | OUTPATIENT
Start: 2024-10-02 | End: 2024-10-02

## 2024-10-02 RX ORDER — ACETAMINOPHEN 500 MG/5ML
1000 LIQUID (ML) ORAL ONCE
Refills: 0 | Status: COMPLETED | OUTPATIENT
Start: 2024-10-02 | End: 2024-10-02

## 2024-10-02 RX ADMIN — Medication 400 MILLIGRAM(S): at 16:31

## 2024-10-02 RX ADMIN — SODIUM CHLORIDE 1000 MILLILITER(S): 9 INJECTION, SOLUTION INTRAVENOUS at 16:42

## 2024-10-02 NOTE — ASU PATIENT PROFILE, ADULT - NSALCOHOLPROBLEMSRELYN_GEN_A_CORE_SD
----- Message from Jose Alejandro Garrison MD sent at 10/2/2024 10:34 AM CDT -----  I would like to inform you about the biopsy result of your endoscopy.  The tissue sample from one of the 5 polyps were removed was precancerous.  This polyp was removed which is good news.  No sign of cancer.  I recommend repeating colonoscopy in 5-year given the quality of your prep.    Thank you for allowing me to participate in your healthcare. Should you have any questions or concerns please do not hesitate to contact me.     Sincerely yours,   Jose Alejandro Garrison MD  Gastroenterology    
Called Yoan Cordova to discuss the results and recommendations as below. Patient verbalized understanding and agreement with no further questions.     Recall and HM updated.   
no

## 2024-11-04 ENCOUNTER — APPOINTMENT (OUTPATIENT)
Dept: ULTRASOUND IMAGING | Facility: IMAGING CENTER | Age: 54
End: 2024-11-04

## 2024-11-04 ENCOUNTER — OUTPATIENT (OUTPATIENT)
Dept: OUTPATIENT SERVICES | Facility: HOSPITAL | Age: 54
LOS: 1 days | End: 2024-11-04
Payer: COMMERCIAL

## 2024-11-04 DIAGNOSIS — Z00.8 ENCOUNTER FOR OTHER GENERAL EXAMINATION: ICD-10-CM

## 2024-11-04 DIAGNOSIS — Z90.710 ACQUIRED ABSENCE OF BOTH CERVIX AND UTERUS: Chronic | ICD-10-CM

## 2024-11-04 DIAGNOSIS — Z98.890 OTHER SPECIFIED POSTPROCEDURAL STATES: Chronic | ICD-10-CM

## 2024-11-04 DIAGNOSIS — K08.409 PARTIAL LOSS OF TEETH, UNSPECIFIED CAUSE, UNSPECIFIED CLASS: Chronic | ICD-10-CM

## 2024-11-04 DIAGNOSIS — Z90.49 ACQUIRED ABSENCE OF OTHER SPECIFIED PARTS OF DIGESTIVE TRACT: Chronic | ICD-10-CM

## 2024-11-04 PROCEDURE — 76982 USE 1ST TARGET LESION: CPT | Mod: 26,59

## 2024-11-04 PROCEDURE — 76982 USE 1ST TARGET LESION: CPT

## 2024-11-04 PROCEDURE — 76700 US EXAM ABDOM COMPLETE: CPT | Mod: 26

## 2024-11-04 PROCEDURE — 76700 US EXAM ABDOM COMPLETE: CPT

## 2024-12-03 NOTE — H&P PST ADULT - ALLERGIC/IMMUNOLOGIC COMMENTS
Addended by: JESSICA SAMPSON on: 12/3/2024 01:12 PM     Modules accepted: Orders     See allergy section

## 2025-07-07 ENCOUNTER — OUTPATIENT (OUTPATIENT)
Dept: OUTPATIENT SERVICES | Facility: HOSPITAL | Age: 55
LOS: 1 days | End: 2025-07-07
Payer: SELF-PAY

## 2025-07-07 ENCOUNTER — APPOINTMENT (OUTPATIENT)
Dept: CT IMAGING | Facility: CLINIC | Age: 55
End: 2025-07-07
Payer: SELF-PAY

## 2025-07-07 DIAGNOSIS — Z00.8 ENCOUNTER FOR OTHER GENERAL EXAMINATION: ICD-10-CM

## 2025-07-07 DIAGNOSIS — Z98.890 OTHER SPECIFIED POSTPROCEDURAL STATES: Chronic | ICD-10-CM

## 2025-07-07 DIAGNOSIS — Z90.49 ACQUIRED ABSENCE OF OTHER SPECIFIED PARTS OF DIGESTIVE TRACT: Chronic | ICD-10-CM

## 2025-07-07 DIAGNOSIS — K08.409 PARTIAL LOSS OF TEETH, UNSPECIFIED CAUSE, UNSPECIFIED CLASS: Chronic | ICD-10-CM

## 2025-07-07 DIAGNOSIS — Z90.710 ACQUIRED ABSENCE OF BOTH CERVIX AND UTERUS: Chronic | ICD-10-CM

## 2025-07-07 PROCEDURE — 75571 CT HRT W/O DYE W/CA TEST: CPT | Mod: 26

## 2025-07-07 PROCEDURE — 75571 CT HRT W/O DYE W/CA TEST: CPT
